# Patient Record
Sex: MALE | Race: WHITE | NOT HISPANIC OR LATINO | Employment: FULL TIME | URBAN - METROPOLITAN AREA
[De-identification: names, ages, dates, MRNs, and addresses within clinical notes are randomized per-mention and may not be internally consistent; named-entity substitution may affect disease eponyms.]

---

## 2017-07-14 ENCOUNTER — OFFICE VISIT - HEALTHEAST (OUTPATIENT)
Dept: FAMILY MEDICINE | Facility: CLINIC | Age: 37
End: 2017-07-14

## 2017-07-14 DIAGNOSIS — S46.911A SHOULDER STRAIN, RIGHT, INITIAL ENCOUNTER: ICD-10-CM

## 2017-07-14 DIAGNOSIS — S16.1XXA CERVICAL STRAIN: ICD-10-CM

## 2017-07-21 ENCOUNTER — COMMUNICATION - HEALTHEAST (OUTPATIENT)
Dept: TELEHEALTH | Facility: CLINIC | Age: 37
End: 2017-07-21

## 2017-07-21 ENCOUNTER — OFFICE VISIT - HEALTHEAST (OUTPATIENT)
Dept: FAMILY MEDICINE | Facility: CLINIC | Age: 37
End: 2017-07-21

## 2017-07-21 DIAGNOSIS — S16.1XXA NECK STRAIN: ICD-10-CM

## 2017-07-21 DIAGNOSIS — S46.919A SHOULDER STRAIN: ICD-10-CM

## 2017-07-26 ENCOUNTER — OFFICE VISIT - HEALTHEAST (OUTPATIENT)
Dept: PHYSICAL THERAPY | Facility: REHABILITATION | Age: 37
End: 2017-07-26

## 2017-07-26 DIAGNOSIS — M54.2 NECK PAIN ON RIGHT SIDE: ICD-10-CM

## 2017-07-26 DIAGNOSIS — M25.511 ACUTE PAIN OF RIGHT SHOULDER: ICD-10-CM

## 2017-07-26 DIAGNOSIS — R29.3 ABNORMAL POSTURE: ICD-10-CM

## 2017-07-26 DIAGNOSIS — M62.81 GENERALIZED MUSCLE WEAKNESS: ICD-10-CM

## 2017-07-26 DIAGNOSIS — M43.6 NECK STIFFNESS: ICD-10-CM

## 2017-08-02 ENCOUNTER — OFFICE VISIT - HEALTHEAST (OUTPATIENT)
Dept: PHYSICAL THERAPY | Facility: REHABILITATION | Age: 37
End: 2017-08-02

## 2017-08-02 DIAGNOSIS — M54.2 NECK PAIN ON RIGHT SIDE: ICD-10-CM

## 2017-08-02 DIAGNOSIS — M25.511 ACUTE PAIN OF RIGHT SHOULDER: ICD-10-CM

## 2017-08-02 DIAGNOSIS — R29.3 ABNORMAL POSTURE: ICD-10-CM

## 2017-08-02 DIAGNOSIS — M62.81 GENERALIZED MUSCLE WEAKNESS: ICD-10-CM

## 2017-08-02 DIAGNOSIS — M43.6 NECK STIFFNESS: ICD-10-CM

## 2017-08-16 ENCOUNTER — OFFICE VISIT - HEALTHEAST (OUTPATIENT)
Dept: PHYSICAL THERAPY | Facility: REHABILITATION | Age: 37
End: 2017-08-16

## 2017-08-16 DIAGNOSIS — M43.6 NECK STIFFNESS: ICD-10-CM

## 2017-08-16 DIAGNOSIS — M54.2 NECK PAIN ON RIGHT SIDE: ICD-10-CM

## 2017-08-16 DIAGNOSIS — M25.511 ACUTE PAIN OF RIGHT SHOULDER: ICD-10-CM

## 2017-08-16 DIAGNOSIS — M62.81 GENERALIZED MUSCLE WEAKNESS: ICD-10-CM

## 2017-08-16 DIAGNOSIS — R29.3 ABNORMAL POSTURE: ICD-10-CM

## 2017-08-23 ENCOUNTER — OFFICE VISIT - HEALTHEAST (OUTPATIENT)
Dept: PHYSICAL THERAPY | Facility: REHABILITATION | Age: 37
End: 2017-08-23

## 2017-08-23 DIAGNOSIS — M54.2 NECK PAIN ON RIGHT SIDE: ICD-10-CM

## 2017-08-23 DIAGNOSIS — M25.511 ACUTE PAIN OF RIGHT SHOULDER: ICD-10-CM

## 2017-08-23 DIAGNOSIS — M43.6 NECK STIFFNESS: ICD-10-CM

## 2017-12-14 ENCOUNTER — OFFICE VISIT - HEALTHEAST (OUTPATIENT)
Dept: INTERNAL MEDICINE | Facility: CLINIC | Age: 37
End: 2017-12-14

## 2017-12-14 DIAGNOSIS — Z00.00 ANNUAL PHYSICAL EXAM: ICD-10-CM

## 2017-12-14 DIAGNOSIS — R29.898 WEAKNESS OF RIGHT UPPER EXTREMITY: ICD-10-CM

## 2017-12-14 DIAGNOSIS — N50.812 LEFT TESTICULAR PAIN: ICD-10-CM

## 2017-12-14 DIAGNOSIS — M54.2 NECK PAIN: ICD-10-CM

## 2017-12-15 ENCOUNTER — COMMUNICATION - HEALTHEAST (OUTPATIENT)
Dept: INTERNAL MEDICINE | Facility: CLINIC | Age: 37
End: 2017-12-15

## 2017-12-18 ENCOUNTER — COMMUNICATION - HEALTHEAST (OUTPATIENT)
Dept: INTERNAL MEDICINE | Facility: CLINIC | Age: 37
End: 2017-12-18

## 2018-08-14 ENCOUNTER — OFFICE VISIT - HEALTHEAST (OUTPATIENT)
Dept: FAMILY MEDICINE | Facility: CLINIC | Age: 38
End: 2018-08-14

## 2018-08-14 DIAGNOSIS — M54.12 RADICULAR PAIN OF SHOULDER: ICD-10-CM

## 2018-08-16 ENCOUNTER — OFFICE VISIT - HEALTHEAST (OUTPATIENT)
Dept: INTERNAL MEDICINE | Facility: CLINIC | Age: 38
End: 2018-08-16

## 2018-08-16 DIAGNOSIS — R29.898 RIGHT ARM WEAKNESS: ICD-10-CM

## 2018-08-16 DIAGNOSIS — M54.2 NECK PAIN: ICD-10-CM

## 2018-08-21 ENCOUNTER — COMMUNICATION - HEALTHEAST (OUTPATIENT)
Dept: SCHEDULING | Facility: CLINIC | Age: 38
End: 2018-08-21

## 2018-08-21 DIAGNOSIS — S46.911A SHOULDER STRAIN, RIGHT, INITIAL ENCOUNTER: ICD-10-CM

## 2018-08-21 DIAGNOSIS — S16.1XXA CERVICAL STRAIN: ICD-10-CM

## 2018-08-23 ENCOUNTER — COMMUNICATION - HEALTHEAST (OUTPATIENT)
Dept: SCHEDULING | Facility: CLINIC | Age: 38
End: 2018-08-23

## 2018-08-23 DIAGNOSIS — M54.2 NECK PAIN: ICD-10-CM

## 2018-08-23 DIAGNOSIS — R29.898 ARM WEAKNESS: ICD-10-CM

## 2018-08-25 ENCOUNTER — RECORDS - HEALTHEAST (OUTPATIENT)
Dept: ADMINISTRATIVE | Facility: OTHER | Age: 38
End: 2018-08-25

## 2018-08-28 ENCOUNTER — COMMUNICATION - HEALTHEAST (OUTPATIENT)
Dept: INTERNAL MEDICINE | Facility: CLINIC | Age: 38
End: 2018-08-28

## 2018-08-28 DIAGNOSIS — M54.2 CERVICAL PAIN: ICD-10-CM

## 2018-08-28 DIAGNOSIS — M54.12 CERVICAL RADICULOPATHY AT C6: ICD-10-CM

## 2018-08-30 ENCOUNTER — COMMUNICATION - HEALTHEAST (OUTPATIENT)
Dept: INTERNAL MEDICINE | Facility: CLINIC | Age: 38
End: 2018-08-30

## 2018-09-04 ENCOUNTER — RECORDS - HEALTHEAST (OUTPATIENT)
Dept: ADMINISTRATIVE | Facility: OTHER | Age: 38
End: 2018-09-04

## 2018-09-06 ENCOUNTER — OFFICE VISIT - HEALTHEAST (OUTPATIENT)
Dept: NEUROSURGERY | Facility: CLINIC | Age: 38
End: 2018-09-06

## 2018-09-06 DIAGNOSIS — G54.2 CERVICAL NERVE ROOT COMPRESSION: ICD-10-CM

## 2018-09-06 ASSESSMENT — MIFFLIN-ST. JEOR: SCORE: 1920.97

## 2018-09-12 ENCOUNTER — HOSPITAL ENCOUNTER (OUTPATIENT)
Dept: PHYSICAL MEDICINE AND REHAB | Facility: CLINIC | Age: 38
Discharge: HOME | End: 2018-09-12
Attending: NEUROLOGICAL SURGERY

## 2018-09-12 DIAGNOSIS — G54.2 CERVICAL NERVE ROOT COMPRESSION: ICD-10-CM

## 2018-09-19 ENCOUNTER — RECORDS - HEALTHEAST (OUTPATIENT)
Dept: ADMINISTRATIVE | Facility: OTHER | Age: 38
End: 2018-09-19

## 2020-08-21 ENCOUNTER — COMMUNICATION - HEALTHEAST (OUTPATIENT)
Dept: SCHEDULING | Facility: CLINIC | Age: 40
End: 2020-08-21

## 2020-08-25 ENCOUNTER — OFFICE VISIT - HEALTHEAST (OUTPATIENT)
Dept: INTERNAL MEDICINE | Facility: CLINIC | Age: 40
End: 2020-08-25

## 2020-08-25 DIAGNOSIS — I10 BENIGN ESSENTIAL HYPERTENSION: ICD-10-CM

## 2020-08-25 ASSESSMENT — MIFFLIN-ST. JEOR: SCORE: 1880.15

## 2020-09-09 ENCOUNTER — AMBULATORY - HEALTHEAST (OUTPATIENT)
Dept: LAB | Facility: CLINIC | Age: 40
End: 2020-09-09

## 2020-09-09 ENCOUNTER — AMBULATORY - HEALTHEAST (OUTPATIENT)
Dept: NURSING | Facility: CLINIC | Age: 40
End: 2020-09-09

## 2020-09-09 DIAGNOSIS — I10 BENIGN ESSENTIAL HYPERTENSION: ICD-10-CM

## 2020-09-09 LAB
ANION GAP SERPL CALCULATED.3IONS-SCNC: 11 MMOL/L (ref 5–18)
BUN SERPL-MCNC: 18 MG/DL (ref 8–22)
CALCIUM SERPL-MCNC: 9.6 MG/DL (ref 8.5–10.5)
CHLORIDE BLD-SCNC: 103 MMOL/L (ref 98–107)
CHOLEST SERPL-MCNC: 192 MG/DL
CO2 SERPL-SCNC: 26 MMOL/L (ref 22–31)
CREAT SERPL-MCNC: 1.14 MG/DL (ref 0.7–1.3)
FASTING STATUS PATIENT QL REPORTED: YES
GFR SERPL CREATININE-BSD FRML MDRD: >60 ML/MIN/1.73M2
GLUCOSE BLD-MCNC: 99 MG/DL (ref 70–125)
HDLC SERPL-MCNC: 41 MG/DL
LDLC SERPL CALC-MCNC: 125 MG/DL
POTASSIUM BLD-SCNC: 4.3 MMOL/L (ref 3.5–5)
SODIUM SERPL-SCNC: 140 MMOL/L (ref 136–145)
TRIGL SERPL-MCNC: 128 MG/DL

## 2020-09-10 ENCOUNTER — COMMUNICATION - HEALTHEAST (OUTPATIENT)
Dept: INTERNAL MEDICINE | Facility: CLINIC | Age: 40
End: 2020-09-10

## 2020-10-20 ENCOUNTER — OFFICE VISIT - HEALTHEAST (OUTPATIENT)
Dept: INTERNAL MEDICINE | Facility: CLINIC | Age: 40
End: 2020-10-20

## 2020-10-20 DIAGNOSIS — I10 BENIGN ESSENTIAL HYPERTENSION: ICD-10-CM

## 2020-10-20 DIAGNOSIS — Z00.00 ANNUAL PHYSICAL EXAM: ICD-10-CM

## 2020-10-20 ASSESSMENT — MIFFLIN-ST. JEOR: SCORE: 1852.93

## 2020-11-09 ENCOUNTER — VIRTUAL VISIT (OUTPATIENT)
Dept: FAMILY MEDICINE | Facility: OTHER | Age: 40
End: 2020-11-09
Payer: COMMERCIAL

## 2020-11-09 PROCEDURE — 99421 OL DIG E/M SVC 5-10 MIN: CPT | Performed by: PREVENTIVE MEDICINE

## 2020-11-10 NOTE — PROGRESS NOTES
"Date: 2020 16:58:38  Clinician: Gualberto Briggs  Clinician NPI: [id number]  Patient: Mike Rhodes  Patient : 1980  Patient Address: [address] [place] [id number]  Patient Phone: [phone]  Visit Protocol: URI  Patient Summary:  Mike is a 40 year old ( : 1980 ) male who initiated a [facility] Visit for COVID-19 (Coronavirus) evaluation and screening. When asked the question \"Please sign me up to receive news, health information and promotions from [facility].\", Mike responded \"No\".    When asked when his symptoms started, Mike reported that he does not have any symptoms.   He denies taking antibiotic medication in the past month and having recent facial or sinus surgery in the past 60 days.    Pertinent COVID-19 (Coronavirus) information  Mike does not work or volunteer as healthcare worker or a . In the past 14 days, Mike has not worked or volunteered at a healthcare facility or group living setting.   In the past 14 days, he also has not lived in a congregate living setting.   Mike has had a close contact with a laboratory-confirmed COVID-19 patient in the last 14 days. He was not exposed at his work. Date Mike was exposed to the laboratory-confirmed COVID-19 patient: 2020   Additional information about contact with COVID-19 (Coronavirus) patient as reported by the patient (free text): In a practice space with other musicians   Mike is not living in the same household with the COVID-19 positive patient. He was in an enclosed space for greater than 15 minutes with the COVID-19 patient.   During the encounter, neither were wearing masks.   Since 2019, Mike has not been tested for COVID-19 and has not had upper respiratory infection or influenza-like illness.   Pertinent medical history  Mike does not need a return to work/school note.   Weight: 200 lbs   Mike does not smoke or use smokeless tobacco.   Weight: 200 lbs    " MEDICATIONS: benazepril-hydrochlorothiazide oral, ALLERGIES: NKDA  Clinician Response:  Dear Mike,   Based on your exposure to COVID-19 (coronavirus), we would like to test you for this virus.  1. Please call [phone] to schedule your visit. Explain that you were referred by [facility] to have a COVID-19 test. Be ready to share your [facility] visit ID number.  * If you need to schedule in [facility] please call [phone] or for [employer] [place] employees please call [phone].   * If you need to schedule in the [place] area please call [phone]. [place] employees call [phone].   The following will serve as your written order for this COVID Test, ordered by me, for the indication of suspected COVID [Z20.828]: The test will be ordered in [employer], our electronic health record, after you are scheduled. It will show as ordered and authorized by Gregorio Mckeon MD.  Order: COVID-19 (coronavirus) PCR for ASYMPTOMATIC EXPOSURE testing from [facility].   If you know you have had close contact with someone who tested positive, you should be quarantined for 14 days after this exposure. You should stay in quarantine for the14 days even if the covid test is negative, the optimal time to test after exposure is 5-7 days from the exposure  Quarantine means   What should I do?  For safety, it's very important to follow these rules. Do this for 14 days after the date you were last exposed to the virus..  Stay home and away from others. Don't go to school or anywhere else. Generally quarantine means staying home from work but there are some exceptions to this. Please contact your workplace.   No hugging, kissing or shaking hands.  Don't let anyone visit.  Cover your mouth and nose with a mask, tissue or washcloth to avoid spreading germs.  Wash your hands and face often. Use soap and water.  What are the symptoms of COVID-19?  The most common symptoms are cough, fever and trouble breathing. Less common symptoms include headache, body  aches, fatigue (feeling very tired), chills, sore throat, stuffy or runny nose, diarrhea (loose poop), loss of taste or smell, belly pain, and nausea or vomiting (feeling sick to your stomach or throwing up).  After 14 days, if you have still don't have symptoms, you likely don't have this virus.  If you develop symptoms, follow these guidelines.  If you're normally healthy: Please start another [facility] visit to report your symptoms. Go to [email or web address].  If you have a serious health problem (like cancer, heart failure, an organ transplant or kidney disease): Call your specialty clinic. Let them know that you might have COVID-19.  2. When it's time for your COVID test:  Stay at least 6 feet away from others. (If someone will drive you to your test, stay in the backseat, as far away from the  as you can.)  Cover your mouth and nose with a mask, tissue or washcloth.  Go straight to the testing site. Don't make any stops on the way there or back.  Please note  Caregivers in these groups are at risk for severe illness due to COVID-19:  o People 65 years and older  o People who live in a nursing home or long-term care facility  o People with chronic disease (lung, heart, cancer, diabetes, kidney, liver, immunologic)  o People who have a weakened immune system, including those who:  Are in cancer treatment  Take medicine that weakens the immune system, such as corticosteroids  Had a bone marrow or organ transplant  Have an immune deficiency  Have poorly controlled HIV or AIDS  Are obese (body mass index of 40 or higher)  Smoke regularly  Where can I get more information?   [employer] -- About COVID-19: [email or web address]  CDC -- What to Do If You're Sick: www.cdc.gov/coronavirus/2019-ncov/about/steps-when-sick.html  CDC -- Ending Home Isolation: www.cdc.gov/coronavirus/2019-ncov/hcp/disposition-in-home-patients.html  CDC -- Caring for Someone:  www.cdc.gov/coronavirus/2019-ncov/if-you-are-sick/care-for-someone.html  [facility] -- Interim Guidance for Hospital Discharge to Home: www.health.[facility].[place].us/diseases/coronavirus/hcp/hospdischarge.pdf  [facility] clinical trials (COVID-19 research studies): clinicalaffairs.[facility]-clinical-trials  Below are the COVID-19 hotlines at the [place] Department of Health ([facility]). Interpreters are available.  For health questions: Call 713-880-5016 or 1-983.669.1465 (7 a.m. to 7 p.m.)  For questions about schools and childcare: Call 124-723-9662 or 1-409.849.2821 (7 a.m. to 7 p.m.)    Diagnosis: Contact with and (suspected) exposure to other viral communicable diseases  Diagnosis ICD: Z20.828

## 2021-03-23 ENCOUNTER — OFFICE VISIT - HEALTHEAST (OUTPATIENT)
Dept: INTERNAL MEDICINE | Facility: CLINIC | Age: 41
End: 2021-03-23

## 2021-03-23 DIAGNOSIS — I10 BENIGN ESSENTIAL HYPERTENSION: ICD-10-CM

## 2021-03-23 DIAGNOSIS — Q80.9 XERODERMIA: ICD-10-CM

## 2021-03-23 RX ORDER — HYDROCHLOROTHIAZIDE 25 MG/1
25 TABLET ORAL DAILY
Qty: 90 TABLET | Refills: 2 | Status: SHIPPED | OUTPATIENT
Start: 2021-03-23 | End: 2022-04-28

## 2021-05-27 VITALS — DIASTOLIC BLOOD PRESSURE: 64 MMHG | SYSTOLIC BLOOD PRESSURE: 120 MMHG

## 2021-05-28 ASSESSMENT — ASTHMA QUESTIONNAIRES
ACT_TOTALSCORE: 25
ACT_TOTALSCORE: 25

## 2021-05-31 VITALS — BODY MASS INDEX: 31.46 KG/M2 | WEIGHT: 225.6 LBS

## 2021-05-31 VITALS — WEIGHT: 221.3 LBS | BODY MASS INDEX: 30.87 KG/M2

## 2021-05-31 VITALS — BODY MASS INDEX: 31.38 KG/M2 | WEIGHT: 225 LBS

## 2021-06-01 VITALS — BODY MASS INDEX: 30.52 KG/M2 | HEIGHT: 71 IN | WEIGHT: 218 LBS

## 2021-06-01 VITALS — WEIGHT: 218 LBS | BODY MASS INDEX: 30.4 KG/M2

## 2021-06-01 VITALS — WEIGHT: 220.7 LBS | BODY MASS INDEX: 30.78 KG/M2

## 2021-06-04 VITALS
HEART RATE: 70 BPM | SYSTOLIC BLOOD PRESSURE: 126 MMHG | DIASTOLIC BLOOD PRESSURE: 64 MMHG | HEIGHT: 71 IN | BODY MASS INDEX: 28.42 KG/M2 | WEIGHT: 203 LBS

## 2021-06-04 VITALS
WEIGHT: 209 LBS | BODY MASS INDEX: 29.26 KG/M2 | DIASTOLIC BLOOD PRESSURE: 72 MMHG | SYSTOLIC BLOOD PRESSURE: 142 MMHG | HEIGHT: 71 IN

## 2021-06-05 VITALS
DIASTOLIC BLOOD PRESSURE: 72 MMHG | WEIGHT: 196 LBS | SYSTOLIC BLOOD PRESSURE: 118 MMHG | HEART RATE: 70 BPM | BODY MASS INDEX: 27.34 KG/M2

## 2021-06-10 NOTE — TELEPHONE ENCOUNTER
"Pt calling    BP usually in the 120/70 range in 's Office  Pt wife brought home a New BP machine 2 days ago & pt has been monitoring /92 reading  was highest  Now 126/88  No HA  No blurred vision  Denies numbness or weakness on one side of body  Denies chest pain or SOB  Drinks \"a ton of Mountain dew\"  Trying to cut down on soda    Per protocol pt to be seen within 2 weeks  Reviewed care advice & when to call back  Pt agrees with plan  Warm transfer to  for appointment.  Brynn Cabrera RN  [place] Nurse Advisor        Reason for Disposition    [1] Systolic BP  >= 130 OR Diastolic >= 80 AND [2] not taking BP medications    Additional Information    Negative: Symptom is main concern  (e.g., headache, chest pain)    Negative: Low blood pressure is main concern    Negative: Difficult to awaken or acting confused (e.g., disoriented, slurred speech)    Negative: Severe difficulty breathing (e.g., struggling for each breath, speaks in single words)    Negative: [1] Weakness of the face, arm or leg on one side of the body AND [2] new onset    Negative: [1] Numbness (i.e., loss of sensation) of the face, arm or leg on one side of the body AND [2] new onset    Negative: [1] Chest pain lasts > 5 minutes AND [2] history of heart disease  (i.e., heart attack, bypass surgery, angina, angioplasty, CHF)    Negative: [1] Chest pain AND [2] took nitrogylcerin AND [3] pain was not relieved    Negative: Sounds like a life-threatening emergency to the triager    Negative: [1] Systolic BP  >= 160 OR Diastolic >= 100 AND [2] cardiac or neurologic symptoms (e.g., chest pain, difficulty breathing, unsteady gait, blurred vision)    Negative: [1] Pregnant > 20 weeks (or postpartum < 6 weeks) AND [2] new hand or face swelling    Negative: [1] Pregnant > 20 weeks AND [2] BP Systolic BP  >= 140 OR Diastolic >= 90    Negative: [1] Systolic BP  >= 200 OR Diastolic >= 120  AND [2] having NO cardiac or neurologic symptoms    " Negative: [1] Postpartum < 6 weeks AND [2] BP Systolic BP  >= 140 OR Diastolic >= 90    Negative: [1] Systolic BP  >= 180 OR Diastolic >= 110 AND [2] missed most recent dose of blood pressure medication    Negative: Systolic BP  >= 180 OR Diastolic >= 110    Negative: Ran out of BP medications    Negative: Systolic BP  >= 160 OR Diastolic >= 100    Negative: [1] Taking BP medications AND [2] feels is having side effects (e.g., impotence, cough, dizzy upon standing)    Negative: [1] Systolic BP  >= 130 OR Diastolic >= 80 AND [2] pregnant    Negative: [1] Systolic BP  >= 130 OR Diastolic >= 80 AND [2] taking BP medications    Protocols used: HIGH BLOOD PRESSURE-A-AH

## 2021-06-10 NOTE — PROGRESS NOTES
Internal Medicine Office Visit  [facility]   Patient Name: Mike Rhodes  Patient Age: 40 y.o.  YOB: 1980  MRN: [id number]    Date of Visit: 2020  Reason for Office Visit:   Chief Complaint   Patient presents with     Hypertension           Assessment / Plan / Medical Decision Makin. Benign essential hypertension  - START: hydroCHLOROthiazide (HYDRODIURIL) 25 MG tablet; Take 1 tablet (25 mg total) by mouth daily.  Dispense: 90 tablet; Refill: 1. Reviewed common side effects  - Encouraged weight loss, reduction of sodium intake, and additional aerobic exercise. He is motivated to get off of the medication if this becomes possible in the future   - Basic Metabolic Panel; Future  - Lipid [place] FASTING; Future  - Return in 2-3 weeks for BP check and lab appointment         Health Maintenance Review  Health Maintenance   Topic Date Due     ASTHMA ACTION PLAN  1980     Asthma Control Test  1980     HIV SCREENING  01/15/1996     PREVENTIVE CARE VISIT  2018     LIPID  2020     ADVANCE CARE PLANNING  2020     INFLUENZA VACCINE RULE BASED (1) 2020     TD 18+ HE  2024     TDAP ADULT ONE TIME DOSE  Completed     HEPATITIS B VACCINES  Aged Out         I have discontinued Navneet Rhodes's cyclobenzaprine, gabapentin, and HYDROcodone-acetaminophen. I am also having him start on hydroCHLOROthiazide.      HPI:  Mike Rhodes is a 40 y.o. year old who presents to the office today for elevated blood pressure readings on a home cuff. His readings range from 116-140/80s. He denies alcohol or tobacco use. He cut back on caffeine about 4 weeks ago. He exercises regularly. He is not aware of a family history of HTN.     He feels a pulsing in the ears.     Review of Systems- pertinent positive in bold:  Negative for chest pain or dyspnea       Current Scheduled Meds:  Outpatient Encounter Medications as of 2020   Medication  "Sig Dispense Refill     hydroCHLOROthiazide (HYDRODIURIL) 25 MG tablet Take 1 tablet (25 mg total) by mouth daily. 90 tablet 1     [DISCONTINUED] cyclobenzaprine (FLEXERIL) 5 MG tablet Take 1 tablet (5 mg total) by mouth 3 (three) times a day as needed for muscle spasms. 30 tablet 0     [DISCONTINUED] gabapentin (NEURONTIN) 300 MG capsule Take 1 capsule (300 mg total) by mouth 2 (two) times a day. 60 capsule 0     [DISCONTINUED] HYDROcodone-acetaminophen 5-325 mg per tablet Take 1-2 tablets by mouth every 4 (four) hours as needed for pain. 30 tablet 0     No facility-administered encounter medications on file as of 8/25/2020.        Past Medical History:   Diagnosis Date     Asthma      Cervical pain 8/28/2018     Cervical radiculopathy at C6 8/28/2018     Exercise-induced asthma - feels that an MDI did not help - runs now without issues 5/19/2015     Xerodermia - dry skin - cracked/fissured feet in the winter 5/19/2015     Past Surgical History:   Procedure Laterality Date     NO PAST SURGERIES       Social History     Tobacco Use     Smoking status: Never Smoker     Smokeless tobacco: Never Used   Substance Use Topics     Alcohol use: No     Comment: Never a problem, but there are other family members who have had issues.     Drug use: No       Objective / Physical Examination:  Vitals:    08/25/20 1606   BP: 142/72   Weight: 209 lb (94.8 kg)   Height: 5' 11\" (1.803 m)     Wt Readings from Last 3 Encounters:   08/25/20 209 lb (94.8 kg)   09/06/18 218 lb (98.9 kg)   08/16/18 218 lb (98.9 kg)     Body mass index is 29.15 kg/m .     Constitutional: In no apparent distress  ENT: Tympanic membrane clear with landmarks well visualized bilaterally.  Respiratory: Clear to auscultation bilaterally. Normal inspiratory and expiratory effort  Cardiovascular: Regular rate and rhythm. No murmurs, rubs, or gallops. No edema. No carotid bruits.       Orders Placed This Encounter   Procedures     Basic Metabolic Panel     Lipid " [place] FASTING   Followup: Return in about 6 months (around 2/25/2021) for Next scheduled follow up. earlier if needed.        Mariza Nunez, CNP

## 2021-06-11 NOTE — PROGRESS NOTES
I met with Mike Rhodes at the request of Mariza Nunez to recheck his blood pressure.  Blood pressure medications on the MAR were reviewed with patient.    Patient has taken all medications as per usual regimen: Yes  Patient reports tolerating them without any issues or concerns: Yes    Vitals:    09/09/20 0813   BP: 120/64       Blood pressure was taken, previous encounter was reviewed, recorded blood pressure below 140/90.  Patient was discharged and the note will be sent to the provider for final review.

## 2021-06-11 NOTE — PROGRESS NOTES
Chief Complaint   Patient presents with     right shoulder pain     x 2 days ago afer work felt like a stiff neck, now shoulder hurts       HPI    Patient is here for 2 days of gradual onset of R sided neck pain, and right posterior shoulder pain radiating to right upper arm, worsens with certain ROMs. No injury noted. No weakness, numbness, tingling of right shoulder/arm, fever, chills.     ROS: Pertinent ROS noted in HPI.     No Known Allergies    Patient Active Problem List   Diagnosis     Blepharitis     Exercise-induced asthma - feels that an MDI did not help - runs now without issues     Xerodermia - dry skin - cracked/fissured feet in the winter       Family History   Problem Relation Age of Onset     Alcohol abuse Father      History of ETOH issues.  Now abstinent.     No Medical Problems Brother      No Medical Problems Daughter        Social History     Social History     Marital status:      Spouse name: N/A     Number of children: N/A     Years of education: N/A     Occupational History     Not on file.     Social History Main Topics     Smoking status: Never Smoker     Smokeless tobacco: Never Used     Alcohol use No      Comment: Never a problem, but there are other family members who have had issues.     Drug use: No     Sexual activity: Yes     Partners: Female     Other Topics Concern     Not on file     Social History Narrative         Objective:    Vitals:    07/14/17 1321   BP: 122/88   Pulse: 87   Resp: 16   Temp: 98.4  F (36.9  C)   SpO2: 99%       Gen:NAD  CV: RRR, no M, R, G  Pulm: CTAB  MSK: normal inspection of neck, shoulders, back, upper extremities. No tenderness to palpation of neck, back, shoulders, upper extremities. Full ROM of except reduced flexion of neck due to pain. Full ROM and strength of shoulders, 5/5 strength of bilateral upper extremities.   Neuro: 2+ brachioradialis DTRs bilaterally. Intact and symmetric gross sensation of upper extremities.       Cervical  strain  -     cyclobenzaprine (FLEXERIL) 5 MG tablet; Take 1 tablet (5 mg total) by mouth 3 (three) times a day as needed for muscle spasms.  -     ibuprofen (ADVIL,MOTRIN) 800 MG tablet; Take 1 tablet (800 mg total) by mouth every 6 (six) hours as needed for pain.    Shoulder strain, right, initial encounter  -     cyclobenzaprine (FLEXERIL) 5 MG tablet; Take 1 tablet (5 mg total) by mouth 3 (three) times a day as needed for muscle spasms.  -     ibuprofen (ADVIL,MOTRIN) 800 MG tablet; Take 1 tablet (800 mg total) by mouth every 6 (six) hours as needed for pain.

## 2021-06-12 NOTE — PROGRESS NOTES
[facility] Daily Progress     Patient Name: Mike Rhodes  Date: 2017  Visit #: 2  PTA visit #:    Referral Diagnosis:   840.9 (ICD-9-CM) - S46.919A (ICD-10-CM) - Shoulder strain   847.0 (ICD-9-CM) - S16.1XXA (ICD-10-CM) - Neck strain     Referring provider: Ailyn Atkinson MD  Visit Diagnosis:     ICD-10-CM    1. Neck pain on right side M54.2    2. Acute pain of right shoulder M25.511    3. Neck stiffness M43.6    4. Generalized muscle weakness M62.81    5. Abnormal posture R29.3          Assessment:     Patient is benefitting from skilled physical therapy and is making steady progress toward functional goals.  Patient is appropriate to continue with skilled physical therapy intervention, as indicated by initial plan of care.     Pt presenting scapular weakness and SICK scapula today with further assessment. Pt having increased difficulty with maintaining shoulder retraction and proper upward rotation of scapula with shoulder elevation. Pt is understanding of his need to strengthen these muscles and will continue with HEP. Pt continues to have stiffness with R shoulder elevation, but decreasing.     Goal Status:  Pt will: be independent with HEP within 3 weeks.  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  Pt will: decrease SPADI by 9 points to demonstrate improved function within 8 weeks.  Pt will: be able to lift arms overhead without pain for work function within 6 weeks.    Plan / Patient Education:     Continue with initial plan of care.  Progress with home program as tolerated.     Next session: review prone exercises, serratus strengthening, tool assisted STM as helpful, continue progressive scapular strengthening    Subjective:     Pain Ratin  Pt noticing less tightness in his arm and less overall pain. He continues to notice tightness in his neck and weakness throughout the arm including his .       Objective:     SICK scapula on R, limited  "upward rotation and retraction   Weakness in serratus and scapular    Continued general tightness in cervical musculature    Educated on other postural exercises including rows, push-ups    Treatment Today     TREATMENT MINUTES COMMENTS   Evaluation     Self-care/ Home management     Manual therapy 10 In prone:  - grade 3 scapular mobilizations including medial, inferior, and \"J\"  - tool assisted STM to R upper traps, cervical paraspinals, and rhomboids   Neuromuscular Re-education     Therapeutic Activity     Therapeutic Exercises 20 See exercises. Added prone Is, Ts, and Ys also push-ups with plus.   Gait training     Modality__________________                Total 30    Blank areas are intentional and mean the treatment did not include these items.       Ian Moe, PT, DPT  8/2/2017    "

## 2021-06-12 NOTE — PROGRESS NOTES
Assessment:     1. Shoulder strain  predniSONE (DELTASONE) 10 mg tablet    Ambulatory referral to Physical Therapy   2. Neck strain  predniSONE (DELTASONE) 10 mg tablet    Ambulatory referral to Physical Therapy          Plan:     Still with ongoing right shoulder upper back and neck strain.  Prescription given for a burst taper of prednisone and referral made to physical therapy for further evaluation and treatment.  He may continue the cyclobenzaprine as well.  Recommend following up with his primary care physician if getting worse or not improving.    Subjective:       37 y.o. male presents for evaluation continued right shoulder and neck discomfort.  Believes he initially strained his upper back shoulder and neck after doing some weightlifting.  He was seen on 7/14/2017 in walk-in care and was given a muscle relaxant and ibuprofen.  These notes were reviewed by myself.  Overall he states that he was feeling better but 2 days ago woke up in the morning and the pain was worse.  He feels a lot of tightness in the right aspect of his neck and upper back and into his shoulder.  He denies any weakness.  He is not having any numbness tingling or pain down his extremity.  Is wondering what else to do at this point.  Has been using ice but not heat.  Only taking the muscle relaxant on 2 occasions.      The following portions of the patient's history were reviewed and updated as appropriate: allergies, current medications, past family history, past medical history, past social history, past surgical history and problem list.    Review of Systems  A 12 point comprehensive review of systems was negative except as noted.     Objective:      /84  Pulse 82  Temp 98.4  F (36.9  C) (Oral)   Resp 16  Wt (!) 225 lb 9.6 oz (102.3 kg)  SpO2 99%  BMI 31.46 kg/m2  General appearance: alert, appears stated age and cooperative  Neck: She has some tightness of his paraspinal muscles on the right side of his posterior neck.   No midline tenderness.  Extremities: Patient is noted to have excellent range of motion of his shoulder.  He does have significant ropiness and tightness of his trapezius muscle and deltoid muscle on the right. is no overlying skin changes or erythema.    This note has been dictated using voice recognition software. Any grammatical or context distortions are unintentional and inherent to the software

## 2021-06-12 NOTE — PROGRESS NOTES
"[facility] Daily Progress     Patient Name: Mike Rhodes  Date: 2017  Visit #: 3  PTA visit #:    Referral Diagnosis:   840.9 (ICD-9-CM) - S46.919A (ICD-10-CM) - Shoulder strain   847.0 (ICD-9-CM) - S16.1XXA (ICD-10-CM) - Neck strain     Referring provider: Ailyn Atkinson MD  Visit Diagnosis:     ICD-10-CM    1. Neck pain on right side M54.2    2. Acute pain of right shoulder M25.511    3. Neck stiffness M43.6    4. Generalized muscle weakness M62.81    5. Abnormal posture R29.3          Assessment:     Patient is benefitting from skilled physical therapy and is making steady progress toward functional goals.  Patient is appropriate to continue with skilled physical therapy intervention, as indicated by initial plan of care.     Pt continues to have minimal changes in R scapular movement and mobility. Pt ma have been using too high of weights with HEP. Endurance and stability will be more of a focus with higher repetitions. Taping may be appropriate to assist with stability as well as proper mechanics of the R scapular and shoulder movement. Pt was educated on light elbow/wrist stretching for tightness in those areas.     Goal Status:  Pt will: be independent with HEP within 3 weeks.  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  Pt will: decrease SPADI by 9 points to demonstrate improved function within 8 weeks.  Pt will: be able to lift arms overhead without pain for work function within 6 weeks.    Plan / Patient Education:     Continue with initial plan of care.  Progress with home program as tolerated.     Next session: review prone exercises, serratus strengthening (review supine serr punches), tool assisted STM as helpful, continue progressive scapular strengthening (TB RTC strengthening), possible taping if needed    Subjective:     Pain Ratin  Pt continues to notice weakness, especially with his  when at work. \"I have a hard time holding " "onto his drill when at work.\" He has not noticed much pain lately.       Objective:     Continued SICK scapula on R   Educated on importance of high repetitions and low resistance in                         regarding to weight training    Continued stiffness at end ranges of shoulder ABD and flexion   Tightness throughout B shoulders, elbows, and wrists    Treatment Today     TREATMENT MINUTES COMMENTS   Evaluation     Self-care/ Home management     Manual therapy     Neuromuscular Re-education     Therapeutic Activity     Therapeutic Exercises 35 See exercises. Added supine serratus punches to HEP.   Gait training     Modality__________________                Total 35    Blank areas are intentional and mean the treatment did not include these items.       Ian Moe, PT, DPT  8/16/2017  "

## 2021-06-12 NOTE — PROGRESS NOTES
Assessment/Plan:     1. Annual physical exam  - Discussed lifestyle changes. Patient is encouraged to continue healthy eating habits and exercise.   - Incorporate Lots of fresh fruits, fresh green leafy vegetables, and whole grains.   - Its important to limit or avoid prepackaged or processed foods. Avoid foods with alexus sugar content.     2. Benign essential hypertension  - Patient encouraged to continue taking medication as ordered. Monitor for headaches, lightheadedness and dizziness.   - hydroCHLOROthiazide (HYDRODIURIL) 25 MG tablet; Take 1 tablet (25 mg total) by mouth daily.  Dispense: 90 tablet; Refill: 1        Subjective:     Mike Rhodes is a 40 y.o. male who presents for an annual exam.   No recent illness, hospitalizations, injuries, or surgeries. Patient reports doing well. Putting more efforts into eating a healthy diet and exercising. Lives at home with wife and two daughters. Does not report any acute stressors. He has a diagnosis of hypertension, has been taking hydrochlorothiazide 25 mg daily. He s been tolerating his medication pretty well and not experiencing any lightheadedness or dizziness associated with the medication. He reports occassional headaches, but attributes these to not taking in enough water.   He reports forgetting doses about 2-3 times a month. But has otherwise been compliant.     The patient reports that there is not domestic violence in his life.     Healthy Habits:   Regular Exercise: Yes 2x times a week rowing. Playing Tennis every 2 weeks  Sunscreen Use: Yes  Healthy Diet: Yes Typical meal [place] for lunch, dinners at home working on eating more vegetables and salads at home. Drinking about 2 liters of water a day. Cut back on mountain dew to 1 can a few times a week. I cup of coffee a day. Does not drink alcohol.  Dental Visits Regularly: Yes Last seen 3 weeks ago.  Vison Exam: Last year January   Sexually active: Yes      Immunization History   Administered  Date(s) Administered     INFLUENZA,SEASONAL QUAD, PF, =/> 6months 12/14/2017, 10/20/2020     Tdap 12/31/2014           Current Outpatient Medications   Medication Sig Dispense Refill     hydroCHLOROthiazide (HYDRODIURIL) 25 MG tablet Take 1 tablet (25 mg total) by mouth daily. 90 tablet 1     No current facility-administered medications for this visit.      Past Medical History:   Diagnosis Date     Asthma      Cervical pain 8/28/2018     Cervical radiculopathy at C6 8/28/2018     Exercise-induced asthma - feels that an MDI did not help - runs now without issues 5/19/2015     Xerodermia - dry skin - cracked/fissured feet in the winter 5/19/2015     Past Surgical History:   Procedure Laterality Date     NO PAST SURGERIES       Patient has no known allergies.  Family History   Problem Relation Age of Onset     Alcohol abuse Father         History of ETOH issues.  Now abstinent.     No Medical Problems Brother      No Medical Problems Daughter      Colon cancer Maternal Uncle      Social History     Socioeconomic History     Marital status:      Spouse name: Not on file     Number of children: 2     Years of education: Not on file     Highest education level: Not on file   Occupational History     Occupation: Appliance Paper Technician    Social Needs     Financial resource strain: Not on file     Food insecurity     Worry: Not on file     Inability: Not on file     Transportation needs     Medical: Not on file     Non-medical: Not on file   Tobacco Use     Smoking status: Never Smoker     Smokeless tobacco: Never Used   Substance and Sexual Activity     Alcohol use: No     Comment: Never a problem, but there are other family members who have had issues.     Drug use: No     Sexual activity: Yes     Partners: Female   Lifestyle     Physical activity     Days per week: Not on file     Minutes per session: Not on file     Stress: Not on file   Relationships     Social connections     Talks on phone: Not on file     " Gets together: Not on file     Attends Restorationist service: Not on file     Active member of club or organization: Not on file     Attends meetings of clubs or organizations: Not on file     Relationship status: Not on file     Intimate partner violence     Fear of current or ex partner: Not on file     Emotionally abused: Not on file     Physically abused: Not on file     Forced sexual activity: Not on file   Other Topics Concern     Not on file   Social History Narrative     Not on file       Review of Systems  General: Denies, fever, chills fatigue, unintentional weigh tloss.   Eyes: Negative except as noted above  Ears/Nose/Throat: Negative except as noted above  Cardiovascular: Negative except as noted above  Respiratory:  Negative except as noted above  Gastrointestinal:  Negative except as noted above. Stools once a day or every other day.   Genitourinary: Dribbling at the end of urine stream for a few years.   Musculoskeletal: Denies muscle aches, arthralgias.   Skin: Denies skin rashes excessive bruising   Neurologic: gets \"sunday headaches\" thinks its related to poor water intake when he's at home.    Psychiatric: Denies thoughts of self harm or thoughts of harming others.   Endocrine: Denies excessive thirst, urination or intolerance to heat or cold. Denies numbness or tingling in finger tips or toes.   Heme/Lymphatic: Denies excessive bruising.    Allergic/Immunologic:      Objective:      Vitals:    10/20/20 0832   BP: 126/64   Pulse: 70   Weight: 203 lb (92.1 kg)   Height: 5' 11\" (1.803 m)     Wt Readings from Last 3 Encounters:   10/20/20 203 lb (92.1 kg)   08/25/20 209 lb (94.8 kg)   09/06/18 218 lb (98.9 kg)     Body mass index is 28.31 kg/m .     Physical Exam:  Constitutional: Well developed, well nourished, no acute distress.  HEENT: normocephalic/atraumatic, PERRL/EOMI, TMs: Gray, normal light reflex, no nasal discharge.  Oral mucosa: moist; no erythema/exudate  Neck: No " LAD/masses/thyromegaly/bruits  Lungs: clear bilaterally  Heart: regular rate and rhythm, no murmurs/gallops/rubs  Abdomen: Normal bowel sounds, soft, non-tender, non-distended, no masses  Lymphatics: no supraclavicular/cervical LAD. No edema.  Neuro: A&O x 3  Musculoskeletal: no gross deformities.  Skin: no rashes or lesions.  Psych: Behavior appropriate, engaging.  Thought processes congruent, non-tangential    NERI Ashford Student     Patient was seen with NP student, Elsa White. I agree with assessment and plan.  NERI Smith

## 2021-06-12 NOTE — PROGRESS NOTES
[facility] Daily Progress     Patient Name: Mike Rhodes  Date: 2017  Visit #: 4  PTA visit #:    Referral Diagnosis:   840.9 (ICD-9-CM) - S46.919A (ICD-10-CM) - Shoulder strain   847.0 (ICD-9-CM) - S16.1XXA (ICD-10-CM) - Neck strain     Referring provider: Ailyn Atkinson MD  Visit Diagnosis:     ICD-10-CM    1. Neck pain on right side M54.2    2. Acute pain of right shoulder M25.511    3. Neck stiffness M43.6          Assessment:     Patient is benefitting from skilled physical therapy and is making steady progress toward functional goals.  Patient is appropriate to continue with skilled physical therapy intervention, as indicated by initial plan of care.     Pt having improved scapular control today, but continued weakness in the UE. Pt continues to have weakness of the wrist and hand as well. Pt has been more focused on control of the shoulder with HEP and regular exercise routine. Pt to return to PT in a couple weeks.    Goal Status:  Pt will: be independent with HEP within 3 weeks.  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  Pt will: decrease SPADI by 9 points to demonstrate improved function within 8 weeks.  Pt will: be able to lift arms overhead without pain for work function within 6 weeks.    Plan / Patient Education:     Continue with initial plan of care.  Progress with home program as tolerated.     Next session: continue prone exercises, serratus strengthening (review supine serr punches), tool assisted STM as helpful, continue progressive scapular strengthening (TB RTC strengthening), scapular mobilizations as helpful    Subjective:     Pain Ratin  Pt is not noticing much stiffness in the shoulder or neck. He continues to notice weakness in his R  and lifting overhead.    Objective:     Continued SICK scapula on R   Improving control with shoulder elevation    Improving scapular retraction with all exercises    No pain during  "session    Treatment Today     TREATMENT MINUTES COMMENTS   Evaluation     Self-care/ Home management     Manual therapy 10 In prone:  - grade 3 and 4 inferior, medial, and\"J\" mobilizations for mobility   Neuromuscular Re-education     Therapeutic Activity     Therapeutic Exercises 20 See exercises   Gait training     Modality__________________                Total 30    Blank areas are intentional and mean the treatment did not include these items.       Ian Moe, PT, DPT  8/23/2017  "

## 2021-06-12 NOTE — PROGRESS NOTES
[facility]   Cervical Thoracic Initial Evaluation    Patient Name: Mike Rhodes  Date of evaluation: 7/26/2017  Referral Diagnosis: Shoulder strain  Referring provider: Ailyn Atkinson MD  Visit Diagnosis:     ICD-10-CM    1. Neck pain on right side M54.2    2. Acute pain of right shoulder M25.511    3. Neck stiffness M43.6    4. Generalized muscle weakness M62.81    5. Abnormal posture R29.3        Assessment:     Mike Rhodes is a 37 y.o. male who presents to therapy today with chief complaints of R sided neck and shoulder pain and stiffness that has been ongoing since 7/12/17. Pt does not recall an injury. Pt presents with stiffness and tightness with cervical L SB and R rotation. Minimal differences in R and L UE strengthening, but stiffness at end ranges with R shoulder ROM. Pt presents with poor posture which may be contributing to muscle tightness. Tenderness to R posterior deltoid was also present, but no radiating pain or loss in sensation. Pt would benefit from furthering stretching and postural strengthening/stabilization. Pt reports no other significant past medical history.  Pain symptoms are not improving.  Functional impairments include prolonged standing/sitting/walking, transfers, bed mobility, lifting, reaching, and ADLs.  Pt demo's signs and sx consistent with possible R shoulder strain.     Impairments in  pain, posture, ROM, strength, ADL's  The POC is dynamic and will be modified on an ongoing basis.  Patient will return to clinic if symptoms persist.  Barriers to achieving goals as noted in the assessment section may affect outcome.  Prognosis to achieve goals is  good   Skilled PT is required to improve mobility, ROM, flexibility, posture, strength, and reduce pain.  Pt. is appropriate for skilled PT intervention as outlined in the Plan of Care (POC).  Pt. is a good candidate for skilled PT services to improve pain levels and function.    Goals:  Pt will: be  independent with HEP within 3 weeks.  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  Pt will: decrease SPADI by 9 points to demonstrate improved function within 8 weeks.  Pt will: be able to lift arms overhead without pain for work function within 6 weeks.    Patient's expectations/goals are realistic.    Barriers to Learning or Achieving Goals:  No Barriers.       Plan / Patient Instructions:        Plan of Care:   Authorization / Certification Number of Visits: up to 20  Communication with: Referral Source  Patient Related Instruction: Nature of Condition;Treatment plan and rationale;Self Care instruction;Body mechanics;Posture;Basis of treatment;Precautions;Next steps;Expected outcome  Times per Week: 1-2  Number of Weeks: 4-5  Number of Visits: 8-10  Discharge Planning: Pt will be discharged when all goals are met, lack of progress or worsening condition, MD referral, and/or pt desires to continue with HEP independently.  Therapeutic Exercise: ROM;Stretching;Strengthening  Neuromuscular Reeducation: kinesio tape;posture;balance/proprioception;TNE  Manual Therapy: soft tissue mobilization;myofascial release;muscle energy;joint mobilization;strain counterstrain  Modalities: traction;electrical stimulation;TENS;iontophoresis;ultrasound;cold pack;hot pack  Functional Training (ADL's): self care;ADL's;compensatory training;meal prep;instructions in transfers;ergonomics;instructions for equipment  Equipment: theraband;TENS unit    Plan for next visit: review HEP, STM/MFR to neck musculature and parascapular, pec stretching, postural strengthening     Subjective:    Social information:   Occupation:appliance repair   Work Status:Working part time   Equipment Available: None    History of Present Illness:    Mike is a 37 y.o. male who presents to therapy today with complaints of R sided neck and shoulder pain that has been ongoing for 2-3 weeks. He does not recall any injury. He initially  "noticed stiffness in his neck before going to bed and noticing more pain into the R upper arm when waking up. He has had \"tolerable pain\" since starting taking prednisone. Symptoms are not improving. He denies history of similar symptoms. He describes their previous level of function as not limited.    Pain Ratin  Pain rating at best: 2  Pain rating at worst: 8  Pain description: dull and weakness    Functional limitations are described as occurring with:   bed mobility  lifting  performing routine daily activities  sitting : 10-15 minute tolerance  sleeping  standing : 2-3 hour tolerance    Patient reports benefit from:  prednisone         Objective:      Note: Items left blank indicates the item was not performed or not indicated at the time of the evaluation.    Patient Outcome Measures :    Shoulder Pain and Disability Index (SPADI) in %: 40   Scores range from 0-100%, where a score of 0% represents minimal pain and maximal function. The minimal clincically important difference is a score reduction of 10%.    Cervical Thoracic Examination  1. Neck pain on right side     2. Acute pain of right shoulder     3. Neck stiffness     4. Generalized muscle weakness     5. Abnormal posture       Involved side: Right  Posture Observation:      General sitting posture is  fair.  General standing posture is fair.    Cervical ROM:    Date: 17     *Indicate scale AROM AROM AROM   Cervical Flexion Min restriction, tightness     Cervical Extension WFL      Right Left Right Left Right Left   Cervical Sidebending 40 deg 28 deg       Cervical Rotation WFL, tightness WFL       Cervical Protraction      Cervical Retraction      Thoracic Flexion      Thoracic Extension      Thoracic Sidebending         Thoracic Rotation           Strength     Date: 17     Cervical Myotomes/5 Right Left Right Left Right Left   Cervical Flexion (C1-2)         Cervical Sidebending (C3)         Shoulder Elevation (C4) 5- 5       Shoulder " Abduction (C5) 5- 5       Elbow Flexion (C6) 5 5       Elbow Extension (C7) 5 5       Wrist Flexion (C7)         Wrist Extension (C6)         Thumb abduction (C8)         Finger Abduction (T1)           Sensation: not impaired  Flexibility: general tightness in R neck musculature, mild stiffness with R shoulder elevation (WFL)    Palpation: mild tenderness to R upper trap and posterior shoulder    Passive Mobility-Joint Integrity: WNL.    Cervical Special Tests     Cervical Special Tests Right Left UE Nerve Mobility Right Left   Cervical compression - - Median nerve     Cervical distraction   Ulnar nerve     Spurling s test - - Radial nerve     Shoulder abduction sign   Thoracic outlet     Deep neck flexor endurance test   Anderson     Upper cervical rotation   Adson s     Sharper-Shelby   Cervical rotation lateral flexion     Alar ligament test   Other:     Other:   Other:       Shoulder Special Tests  Impingement Cluster Right (+/-) Left (+/-) Rotator Cuff Tests Right (+/-) Left (+/-)   Doran-Marko -  Drop Arm Sign     Painful Arc -  Hornblowers     Infraspinatus Test -  ERLS     AC Tests Right (+/-) Left (+/-) IRLS     Active Compression   Labral Tests Right (+/-) Left (+/-)   Crossbody Adduction   Biceps Load Test II     AC Resisted Extension   Jerk Test     GH Instability Tests Right (+/-) Left (+/-) Nivia Test     Sulcus Sign   Biceps Tests Right (+/-) Left (+/-)   Apprehension   Speed     Relocation   Rick cyr     Other:   Other:     Other:   Other:         UE Screen: R sided neck tightness, stiffness in R shoulder, pec tightness    Treatment Today     TREATMENT MINUTES COMMENTS   Evaluation 30    Self-care/ Home management     Manual therapy 10 In supine:  - STM/MFR to R upper traps, levator, scalenes, and paraspinals   Neuromuscular Re-education     Therapeutic Activity     Therapeutic Exercises 15 See exercises for HEP. Pt educated on use of tennis ball for TPR. Also, discussed future POC.   Gait training      Modality__________________                Total 55    Blank areas are intentional and mean the treatment did not include these items.     PT Evaluation Code: (Please list factors)  Patient History/Comorbidities: none  Examination: R neck stiffness and pain, R shoulder pain, abnormal posture, weakness  Clinical Presentation: stable  Clinical Decision Making: low    Patient History/  Comorbidities Examination  (body structures and functions, activity limitations, and/or participation restrictions) Clinical Presentation Clinical Decision Making (Complexity)   No documented Comorbidities or personal factors 1-2 Elements Stable and/or uncomplicated Low   1-2 documented comorbidities or personal factor 3 Elements Evolving clinical presentation with changing characteristics Moderate   3-4 documented comorbidities or personal factors 4 or more Unstable and unpredictable High          Ian Moe, PT, DPT  7/26/2017  4:02 PM

## 2021-06-13 NOTE — PROGRESS NOTES
[facility] Discharge Summary  Patient Name: Mike Vernonkinoreen  Date: 9/25/2017  Referral Diagnosis:   840.9 (ICD-9-CM) - S46.919A (ICD-10-CM) - Shoulder strain   847.0 (ICD-9-CM) - S16.1XXA (ICD-10-CM) - Neck strain     Referring provider: Ailyn Atkinson MD  Visit Diagnosis:   1. Neck pain on right side     2. Acute pain of right shoulder     3. Neck stiffness         Goals:  Pt will: be independent with HEP within 3 weeks. (Goal Met)  Pt will: be able to return to playing bass without pain within 6 weeks. (Not Met)  Pt will: decrease SPADI by 9 points to demonstrate improved function within 8 weeks. (Not Met)  Pt will: be able to lift arms overhead without pain for work function within 6 weeks. (Improving)    Patient was seen for 4 visits from initial evaluation to 8/23/17 with 0 missed appointments.  The patient attended therapy initially, but did not finish the therapy sessions prescribed.  Goals were not fully achieved. Explanation for goals not achieved: did not return  The patient was instructed to follow up with physician's clinic.  Patient received a home program   The patient discontinued therapy, did not return.  No further therapy is required at this time.  Pt continues to have weakenss in the shoulder, but less overall pain. If weakness does not improve, recommend pt to return to referring provider.    Therapy will be discontinued at this time.  The patient will need a new referral to resume.    Thank you for your referral.  Ian Moe, PT, DPT  9/25/2017  12:53 PM

## 2021-06-14 NOTE — PROGRESS NOTES
Assessment/Plan:     1. Annual physical exam  2. Left testicular pain  3. Weakness of right upper extremity  4. Neck pain  - No abnormality on exam noted, low suspicion for testicular torsion and he was reassured of the same. Will advise scrotal support and will check ultrasound tomorrow to rule out pathology   - MR Cervical Spine Without Contrast; Future. If any abnormalities will refer to surgery given weakness in right hand and scapular winging   -I recommended he eat a healthy diet and exercise on a regular basis      Subjective:     Mike Rhodes is a 37 y.o. male who presents for an annual exam. He does have several concern outside of the physical that he would like to review today.     The first is left testicular pain. This started last weekend. He was in his usual state of health on Saturday when he went to bed but awoke early Sunday morning with left testicular pain. He had to walk cautiously due to the pain and felt nauseated. The pain is better now and more intermittent but not entirely resolved. He denies any recent or history of testicular trauma. No concern for STI, he is  with only 1 partner. No dysuria or hematuria.     He has a history of sudden right neck and shoulder pain in July of this year. He states that he awoke with the pain and does not recall any injury or change in his usual activities. 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 He was seen in [facility], NSAID and muscle relaxer were prescribed. The pain resolved for a while particularly with rest, however, he again awoke with pain and tightness in the right neck and upper pack to the shoulder. He was given prednisone and referred to physical therapy. At the current time he has no pain in the neck or shoulder, however, he has weakness in the shoulder and hands. He plays the bass, he is not able to keep up with fast songs the way he used to be able to. He was previously lifting weights  regularly and can no longer bench press due to the weakness in his hand and arm on the right side. He is able to do push-ups but fatigues more quickly than before.     We reviewed his history of asthma. This was previously exercise induced only. He is now able to exercise without respiratory symptoms.     The patient reports that there is not domestic violence in his life.     Healthy Habits:   Regular Exercise: Yes  Sunscreen Use: Yes  Healthy Diet: Yes  Dental Visits Regularly: Yes  Sexually active: Yes      Immunization History   Administered Date(s) Administered     Influenza,seasonal quad, PF, 36+MOS 12/14/2017     Tdap 12/31/2014     Immunization status: influenza vaccine today         Current Outpatient Prescriptions   Medication Sig Dispense Refill     ibuprofen (ADVIL,MOTRIN) 800 MG tablet Take 1 tablet (800 mg total) by mouth every 6 (six) hours as needed for pain. 30 tablet 0     cyclobenzaprine (FLEXERIL) 5 MG tablet Take 1 tablet (5 mg total) by mouth 3 (three) times a day as needed for muscle spasms. 30 tablet 0     predniSONE (DELTASONE) 10 mg tablet Take 40mg by mouth daily for 3 days, then 30mg x 3 days, then 20mg x 3 days ,then 10mg x 3 days then stop 30 tablet 0     No current facility-administered medications for this visit.      Past Medical History:   Diagnosis Date     Asthma      History reviewed. No pertinent surgical history.  Review of patient's allergies indicates no known allergies.  Family History   Problem Relation Age of Onset     Alcohol abuse Father      History of ETOH issues.  Now abstinent.     No Medical Problems Brother      No Medical Problems Daughter      Colon cancer Maternal Uncle      Social History     Social History     Marital status:      Spouse name: N/A     Number of children: 2     Years of education: N/A     Occupational History     Appliance Paper Technician       Social History Main Topics     Smoking status: Never Smoker     Smokeless tobacco: Never Used      Alcohol use No      Comment: Never a problem, but there are other family members who have had issues.     Drug use: No     Sexual activity: Yes     Partners: Female     Other Topics Concern     Not on file     Social History Narrative       Review of Systems  General:  Negative except as noted above  Eyes: Negative except as noted above  Ears/Nose/Throat: Negative except as noted above  Cardiovascular: Negative except as noted above  Respiratory:  Negative except as noted above  Gastrointestinal:  Negative except as noted above  Musculoskeletal:  Negative except as noted above  Skin: Negative except as noted above  Neurologic: Negative except as noted above  Psychiatric: Negative except as noted above  Endocrine: Negative except as noted above  Heme/Lymphatic: Negative except as noted above   Allergic/Immunologic: Negative except as noted above      Objective:      Vitals:    12/14/17 1540   BP: 124/74   Pulse: 68   Weight: (!) 225 lb (102.1 kg)     Wt Readings from Last 3 Encounters:   12/14/17 (!) 225 lb (102.1 kg)   07/21/17 (!) 225 lb 9.6 oz (102.3 kg)   07/14/17 221 lb 4.8 oz (100.4 kg)     Body mass index is 31.38 kg/(m^2).     Physical Exam:  Constitutional: Well developed, well nourished, no acute distress.  HEENT: normocephalic/atraumatic, PERRLA/EOMI, TMs: Gray, normal light reflex, no nasal discharge.  Oral mucosa: moist; no erythema/exudate  Neck: No LAD/masses/thyromegaly/bruits  Lungs: clear bilaterally  Heart: regular rate and rhythm, no murmurs/gallops/rubs  Abdomen: Normal bowel sounds, soft, non-tender, non-distended, no masses, neg Ruff's/McBurney's, no rebound/guarding  Genital: Bilaterally descended testes, no masses, non-tender to palpation today; unable to elicit pain on exam, no hernia.  No urethral discharge or erythema.  No lesions, normal circumcised phallus. Exam chaperoned by Rachael Hernandez MA  Lymphatics: no supraclavicular/cervical/inguinal LAD. No edema.  Neuro: Upper  extremity sensory intact to light touch.  Musculoskeletal: Neck: No spinous process tenderness or paraspinal tenderness. Normal range of motion, no pain with movement.   He has good range of motion in both shoulders bilaterally. Strength in the shoulder is 5/5, right hand  strength 5/5 and symmetrical. There is winging of the medial border of the left scapula with flexion of the shoulders.   Skin: no rashes or lesions. Right arm without edema/erythema.   Psych: Behavior appropriate, engaging.  Thought processes congruent, non-tangential

## 2021-06-16 PROBLEM — M54.12 CERVICAL RADICULOPATHY AT C6: Status: ACTIVE | Noted: 2018-08-28

## 2021-06-16 PROBLEM — M54.2 CERVICAL PAIN: Status: ACTIVE | Noted: 2018-08-28

## 2021-06-16 NOTE — PROGRESS NOTES
Internal Medicine Office Visit  [facility]   Patient Name: Mike Rhodes  Patient Age: 41 y.o.  YOB: 1980  MRN: [id number]    Date of Visit: 3/23/2021  Reason for Office Visit:   Chief Complaint   Patient presents with     Follow-up           Assessment / Plan / Medical Decision Making:    Problem List Items Addressed This Visit     Benign essential hypertension     stable         Relevant Medications    hydroCHLOROthiazide (HYDRODIURIL) 25 MG tablet    Xerodermia - dry skin - cracked/fissured feet in the winter     Advised foot soaks, vaseline based emollient                 I am having Navneet Rhodes maintain his hydroCHLOROthiazide.                No orders of the defined types were placed in this encounter.  Followup: Return in about 6 months (around 9/23/2021) for Annual physical. earlier if needed.        Mariza Nunez, TWIN        HPI:  Mike Rhodes is a 41 y.o. year old who presents to the office today for follow up.     He notes a tightness in the backs of the legs since he ran out of the hydrochlorothiazide medication. Otherwise, no chest pain, dyspnea. No lightheadedness associated with HTN medication.     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 He has added a house humidifier. If he uses lotion consistently it is better.     Health Maintenance Review  Health Maintenance   Topic Date Due     HEPATITIS C SCREENING  Never done     Pneumococcal Vaccine: Pediatrics (0 to 5 Years) and At-Risk Patients (6 to 64 Years) (1 of 2 - PPSV23) Never done     ADVANCE CARE PLANNING  05/19/2020     ASTHMA ACTION PLAN  10/20/2021     PREVENTIVE CARE VISIT  10/20/2021     Asthma Control Test  03/26/2022     TD 18+ HE  12/31/2024     LIPID  09/09/2025     INFLUENZA VACCINE RULE BASED  Completed     TDAP ADULT ONE TIME DOSE  Completed     HEPATITIS B VACCINES  Aged Out     HIV SCREENING  Discontinued       Current Scheduled  Meds:  Outpatient Encounter Medications as of 3/23/2021   Medication Sig Dispense Refill     hydroCHLOROthiazide (HYDRODIURIL) 25 MG tablet Take 1 tablet (25 mg total) by mouth daily. 90 tablet 2     [DISCONTINUED] hydroCHLOROthiazide (HYDRODIURIL) 25 MG tablet Take 1 tablet (25 mg total) by mouth daily. 90 tablet 1     No facility-administered encounter medications on file as of 3/23/2021.            Objective / Physical Examination:  Vitals:    03/23/21 1534   BP: 118/72   Pulse: 70   Weight: 196 lb (88.9 kg)     Wt Readings from Last 3 Encounters:   03/23/21 196 lb (88.9 kg)   10/20/20 203 lb (92.1 kg)   08/25/20 209 lb (94.8 kg)     Body mass index is 27.34 kg/m .     Constitutional: In no apparent distress  Respiratory: Clear to auscultation bilaterally. Normal inspiratory and expiratory effort  Cardiovascular: Regular rate and rhythm. No murmurs, rubs, or gallops. No edema. No carotid bruits.

## 2021-06-19 NOTE — PROGRESS NOTES
Chief Complaint   Patient presents with     persistent pain     Happened once last year with the similar pain. This started a couple days ago with a stiff neck. Pain starts from the right neck, and radiates under the right shoulder blade, arms and back. When it happenes he notices that he looses his strength with .           HPI    Patient is here for a few days of right posterior shoulder pain radiating to right hand. Symptoms started with stiffness of the neck. Now the neck stiffness is much better. Pain starts from right shoulder blade, posterior shoulder and radiates down right arm. No tingling nor numbness of right hand and arm. No weakness of right arm. No fever, chills. No recent injury. He had a similar episode last yr and had PT, as well as was recommended for MRI study but has not done the MRI yet.    ROS: Pertinent ROS noted in HPI.     No Known Allergies    Patient Active Problem List   Diagnosis     Exercise-induced asthma - feels that an MDI did not help - runs now without issues     Xerodermia - dry skin - cracked/fissured feet in the winter       Family History   Problem Relation Age of Onset     Alcohol abuse Father      History of ETOH issues.  Now abstinent.     No Medical Problems Brother      No Medical Problems Daughter      Colon cancer Maternal Uncle        Social History     Social History     Marital status:      Spouse name: N/A     Number of children: 2     Years of education: N/A     Occupational History     Appliance Paper Technician       Social History Main Topics     Smoking status: Never Smoker     Smokeless tobacco: Never Used     Alcohol use No      Comment: Never a problem, but there are other family members who have had issues.     Drug use: No     Sexual activity: Yes     Partners: Female     Other Topics Concern     Not on file     Social History Narrative         Objective:    Vitals:    08/14/18 1522   BP: 120/80   Pulse: 81   Resp: 20   Temp: 98  F (36.7  C)    SpO2: 97%       Gen:NAD  CV: RRR, no M, R, G  Pulm: CTAB  MSK: normal inspection of upper extremities, shoulders, and back. Mild tenderness to palpation at R proximal upper arm, and right side of neck. Normal palpation of back, shoulders bilaterally. Full ROM and 55/ strength of bilateral upper extremities.   Neuro: intact and symmetric gross sensation of bilateral upper extremities.      Radicular pain of shoulder  -     predniSONE (DELTASONE) 20 MG tablet; Take 60 mg once daily for three days, then 40 mg once daily for three days, then 20 mg once daily for three days.      Reassuring exam. Recommended f/u with PCP to re-consider MR cervical spine.

## 2021-06-19 NOTE — PROGRESS NOTES
Internal Medicine Office Visit  [facility]  Patient Name: Mike Rhodes  Patient Age: 38 y.o.  YOB: 1980  MRN: [id number]    Date of Visit: 2018  Reason for Office Visit:   Chief Complaint   Patient presents with     Follow-up     [facility]           Assessment / Plan / Medical Decision Makin. Neck pain  2. 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  He is advised that he cannot drive after taking this medication  - Offered Flexeril, he declines at this time  -  Will have a low threshold to refer to neurosurgery for further evaluation and management due to the right arm weakness and pain which has recurred since 2017          Health Maintenance Review  Health Maintenance   Topic Date Due     ASTHMA CONTROL TEST  1980     ASTHMA FOLLOW-UP  1980     INFLUENZA VACCINE RULE BASED (1) 2018     ADVANCE DIRECTIVES DISCUSSED WITH PATIENT  2020     TD 18+ HE  2024     TDAP ADULT ONE TIME DOSE  Completed         I am having Mr. Rhodes maintain his predniSONE and predniSONE.      HPI:  Mike Rhodes is a 38 y.o. year old who presents to the office today for follow-up of neck and shoulder pain.  Was recently seen in walk-in care on  for similar symptoms.  He states that he had a show with his have a metal band and was head banging.  Approximately 12 days later he started to have stiffness in the right side of the neck.  He then started to have pain which radiated down into the shoulder.  He has less strength in the right shoulder and extremity.  He has noticed that he is unable to bench press do to this weakness.  He has a dull pain in the right  posterior shoulder.  He has been treating this with ice.  He wakes up in the middle the night due to the discomfort and cannot sleep typically between the hours of 2 AM to 4 AM.  The pain was worse yesterday and today.  In the walk-in care visit he was given prednisone and Advil.  He has seen mild improvement in the pain but not sufficient to allow him to sleep through the night.  He does have a history of hockey and backyard football play resulting in various non-specific injuries in the past.  He was seen for similar symptoms in December 2017 and an MRI of the neck was ordered at that time due to weakness in the right hand.  Unfortunately, due to the concerns about the cost he never proceeded with the MRI and his symptoms actually improved with prolonged continuation of the physical therapy exercises that he learned prior to this.      Review of Systems- pertinent positive in bold:  A 10-point ROS is negative except as stated in HPI         Current Scheduled Meds:  Outpatient Encounter Prescriptions as of 8/16/2018   Medication Sig Dispense Refill     predniSONE (DELTASONE) 10 mg tablet Take 40mg by mouth daily for 3 days, then 30mg x 3 days, then 20mg x 3 days ,then 10mg x 3 days then stop 30 tablet 0     predniSONE (DELTASONE) 20 MG tablet Take 60 mg once daily for three days, then 40 mg once daily for three days, then 20 mg once daily for three days. 18 tablet 0     [DISCONTINUED] cyclobenzaprine (FLEXERIL) 5 MG tablet Take 1 tablet (5 mg total) by mouth 3 (three) times a day as needed for muscle spasms. 30 tablet 0     [DISCONTINUED] HYDROcodone-acetaminophen 5-325 mg per tablet Take 1 tablet by mouth every 4 (four) hours as needed for pain. 15 tablet 0     No facility-administered encounter medications on file as of 8/16/2018.      Past Medical History:   Diagnosis Date     Asthma      History reviewed. No pertinent surgical history.  Social History   Substance Use Topics     Smoking status: Never Smoker      Smokeless tobacco: Never Used     Alcohol use No      Comment: Never a problem, but there are other family members who have had issues.       Objective / Physical Examination:  Vitals:    08/16/18 1423   BP: 136/76   Pulse: 80   Weight: 218 lb (98.9 kg)     Wt Readings from Last 3 Encounters:   08/16/18 218 lb (98.9 kg)   08/14/18 220 lb 11.2 oz (100.1 kg)   12/14/17 (!) 225 lb (102.1 kg)     Body mass index is 30.4 kg/(m^2).     General Appearance: Cooperative, affect appropriate, speech clear, in no apparent distress  Lungs: Clear to auscultation bilaterally. Normal inspiratory and expiratory effort  Cardiovascular: Regular rate, normal S1, S2. No murmurs, rubs, or gallops  MSK: Neck and shoulders appear atraumatic.  No spinous process tenderness.  He has mild tenderness to palpation of the right proximal upper arm and right side of the neck.  There is winging of the medial border of the left scapula with flexion of the shoulders. He has full extension and flexion of the neck but difficulty with rotation to the right and this is limited as compared to the left.  He has full upper extremity strength and range of motion  Extremities: Radial pulses 2+  Neuro: Alert and oriented x3. No perceptible difference in upper extremity strength.  Sensation in hands intact and symmetrical    Orders Placed This Encounter   Procedures     MR Cervical Spine Without Contrast   Followup: Return if symptoms worsen or fail to improve. earlier if needed.        Mariza Nunez, CNP

## 2021-06-20 NOTE — PROGRESS NOTES
NEUROSURGERY CONSULTATION NOTE:    Assessment:    1. Cervical nerve root compression  OPS TFESI C/T Spine Unilateral        Plan: I have ordered some additional conservative management.  He has not had any treatment with this flare.  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  He should come back in about 4 weeks for further recommendations.    [facility]   [address] [place] [id number]  38 y.o. male is sent to me in consultation   by NERI Smith     CC:    Chief Complaint   Patient presents with     Neck Pain       HPI:  Neurosurgery consultation was requested by: Dr. Mariza Nunez   Pain: Neck pain   Radicular Pain is present: radiates into right shoulder, tricept and forearm   Lhermitte sign: NO  Motor complaints: Denies weakness  Sensory complaints: Numbness in thumb and pointer finger on right hand on and off   Gait and balance issues: Denies   Bowel or bladder issues: Denies   Duration of SX is: Having flare ups for 1 year, recently started 3-4 weeks ago.   The symptoms are worse with: Activity   The symptoms are better with: Gabapentin   Injury: Not sure, pt is in a band   Severity is: Mild - moderate  Patient has tried the following conservative measures: Not recently. Did PT year ago for last flare up and did help      PROBLEM LIST:  1. Cervical nerve root compression           REVIEW OF SYSTEMS:  A 12 point review of systems is negative other than those symptoms listed above.      Past Medical History:   Diagnosis Date     Asthma      Cervical pain 8/28/2018     Cervical radiculopathy at C6 8/28/2018     Exercise-induced asthma - feels that an MDI did not help - runs now without issues 5/19/2015     Xerodermia - dry skin - cracked/fissured feet in the winter 5/19/2015         Past Surgical History:   Procedure Laterality Date     NO PAST SURGERIES           MEDICATIONS:  Current Outpatient Prescriptions   Medication Sig Dispense Refill     cyclobenzaprine  "(FLEXERIL) 5 MG tablet Take 1 tablet (5 mg total) by mouth 3 (three) times a day as needed for muscle spasms. 30 tablet 0     gabapentin (NEURONTIN) 300 MG capsule Take 1 capsule (300 mg total) by mouth 2 (two) times a day. 60 capsule 0     HYDROcodone-acetaminophen 5-325 mg per tablet Take 1-2 tablets by mouth every 4 (four) hours as needed for pain. 30 tablet 0     No current facility-administered medications for this visit.          ALLERGIES/SENSITIVITIES:     No Known Allergies    PERTINENT SOCIAL HISTORY:   Social History     Social History     Marital status:      Spouse name: N/A     Number of children: 2     Years of education: N/A     Occupational History     Appliance Paper Technician       Social History Main Topics     Smoking status: Never Smoker     Smokeless tobacco: Never Used     Alcohol use No      Comment: Never a problem, but there are other family members who have had issues.     Drug use: No     Sexual activity: Yes     Partners: Female       FAMILY HISTORY:  Family History   Problem Relation Age of Onset     Alcohol abuse Father      History of ETOH issues.  Now abstinent.     No Medical Problems Brother      No Medical Problems Daughter      Colon cancer Maternal Uncle         PHYSICAL EXAM:   Constitutional: /83  Pulse 90  Ht 5' 11\" (1.803 m)  Wt 218 lb (98.9 kg)  SpO2 98%  BMI 30.4 kg/m2    General appearance: Appropriately groomed.  No acute distress.  Interactive.     Mental Status: Mental status: Alert and oriented, mood and affect appropriate, language reception and expression normal, recent and remote memory is normal, higher cortical function normal. Attention span, concentration and ability to follow commands is normal.       Cranial Nerves: Face is symmetric.  Extraocular movements are full, conjugate and without nystagmus.  Hearing is preserved.  Shoulder position is symmetric.  Tongue is midline with normal motion.  Palate elevates symmetrically.     Motor: " Motor exam nl bilateral UE, to confrontation test. Tone nl, bulk nl and strength 5/5 all groups.      Sensory: Sensory exam by subjective report intact to LT,PP,Position and Vib. in the UE and  LE, with the exception of some alteration in a distal right C6.     Station and Gait:  Station and Gait- nl stride length,  balance and nelda..     Reflexes; depressed right biceps reflex and brachioradialis.    IMAGING:  I have personally reviewed the images and discussed the findings with Mike Rhodes.  He has degenerative disc disease at C5-6, most notably.  He has a rightward herniation/disc osteophyte complex that does compress the right C6 nerve root.    CC:     Mariza Nunez, [id number] [place] [address]

## 2021-06-20 NOTE — PROGRESS NOTES
Neurosurgery consultation was requested by: Dr. Mariza Nunez   Pain: Neck pain   Radicular Pain is present: radiates into right shoulder, tricept and forearm   Lhermitte sign: NO  Motor complaints: Denies weakness  Sensory complaints: Numbness in thumb and pointer finger on right hand on and off   Gait and balance issues: Denies   Bowel or bladder issues: Denies   Duration of SX is: Having flare ups for 1 year, recently started 3-4 weeks ago.   The symptoms are worse with: Activity   The symptoms are better with: Gabapentin   Injury: Not sure, pt is in a band   Severity is: Mild - moderate  Patient has tried the following conservative measures: Not recently. Did PT year ago for last flare up and did help  NDI score is :   KHOA Downey

## 2021-10-11 ENCOUNTER — HEALTH MAINTENANCE LETTER (OUTPATIENT)
Age: 41
End: 2021-10-11

## 2021-11-10 ENCOUNTER — OFFICE VISIT (OUTPATIENT)
Dept: INTERNAL MEDICINE | Facility: CLINIC | Age: 41
End: 2021-11-10
Payer: COMMERCIAL

## 2021-11-10 VITALS
SYSTOLIC BLOOD PRESSURE: 110 MMHG | HEIGHT: 70 IN | HEART RATE: 72 BPM | BODY MASS INDEX: 28.2 KG/M2 | DIASTOLIC BLOOD PRESSURE: 72 MMHG | WEIGHT: 197 LBS | OXYGEN SATURATION: 100 %

## 2021-11-10 DIAGNOSIS — I10 BENIGN ESSENTIAL HYPERTENSION: ICD-10-CM

## 2021-11-10 DIAGNOSIS — Z00.00 ANNUAL PHYSICAL EXAM: Primary | ICD-10-CM

## 2021-11-10 LAB
ANION GAP SERPL CALCULATED.3IONS-SCNC: 12 MMOL/L (ref 5–18)
BUN SERPL-MCNC: 16 MG/DL (ref 8–22)
CALCIUM SERPL-MCNC: 9.7 MG/DL (ref 8.5–10.5)
CHLORIDE BLD-SCNC: 106 MMOL/L (ref 98–107)
CHOLEST SERPL-MCNC: 196 MG/DL
CO2 SERPL-SCNC: 21 MMOL/L (ref 22–31)
CREAT SERPL-MCNC: 0.92 MG/DL (ref 0.7–1.3)
FASTING STATUS PATIENT QL REPORTED: NO
GFR SERPL CREATININE-BSD FRML MDRD: >90 ML/MIN/1.73M2
GLUCOSE BLD-MCNC: 95 MG/DL (ref 70–125)
HDLC SERPL-MCNC: 47 MG/DL
LDLC SERPL CALC-MCNC: 124 MG/DL
POTASSIUM BLD-SCNC: 4.6 MMOL/L (ref 3.5–5)
SODIUM SERPL-SCNC: 139 MMOL/L (ref 136–145)
TRIGL SERPL-MCNC: 123 MG/DL

## 2021-11-10 PROCEDURE — 36415 COLL VENOUS BLD VENIPUNCTURE: CPT | Performed by: NURSE PRACTITIONER

## 2021-11-10 PROCEDURE — 99396 PREV VISIT EST AGE 40-64: CPT | Mod: 25 | Performed by: NURSE PRACTITIONER

## 2021-11-10 PROCEDURE — 90471 IMMUNIZATION ADMIN: CPT | Performed by: NURSE PRACTITIONER

## 2021-11-10 PROCEDURE — 90686 IIV4 VACC NO PRSV 0.5 ML IM: CPT | Performed by: NURSE PRACTITIONER

## 2021-11-10 PROCEDURE — 80048 BASIC METABOLIC PNL TOTAL CA: CPT | Performed by: NURSE PRACTITIONER

## 2021-11-10 PROCEDURE — 80061 LIPID PANEL: CPT | Performed by: NURSE PRACTITIONER

## 2021-11-10 SDOH — ECONOMIC STABILITY: INCOME INSECURITY: IN THE LAST 12 MONTHS, WAS THERE A TIME WHEN YOU WERE NOT ABLE TO PAY THE MORTGAGE OR RENT ON TIME?: NO

## 2021-11-10 SDOH — ECONOMIC STABILITY: TRANSPORTATION INSECURITY
IN THE PAST 12 MONTHS, HAS LACK OF TRANSPORTATION KEPT YOU FROM MEETINGS, WORK, OR FROM GETTING THINGS NEEDED FOR DAILY LIVING?: NO

## 2021-11-10 SDOH — ECONOMIC STABILITY: FOOD INSECURITY: WITHIN THE PAST 12 MONTHS, YOU WORRIED THAT YOUR FOOD WOULD RUN OUT BEFORE YOU GOT MONEY TO BUY MORE.: NEVER TRUE

## 2021-11-10 SDOH — ECONOMIC STABILITY: INCOME INSECURITY: HOW HARD IS IT FOR YOU TO PAY FOR THE VERY BASICS LIKE FOOD, HOUSING, MEDICAL CARE, AND HEATING?: NOT HARD AT ALL

## 2021-11-10 SDOH — ECONOMIC STABILITY: TRANSPORTATION INSECURITY
IN THE PAST 12 MONTHS, HAS THE LACK OF TRANSPORTATION KEPT YOU FROM MEDICAL APPOINTMENTS OR FROM GETTING MEDICATIONS?: NO

## 2021-11-10 SDOH — HEALTH STABILITY: PHYSICAL HEALTH: ON AVERAGE, HOW MANY MINUTES DO YOU ENGAGE IN EXERCISE AT THIS LEVEL?: 30 MIN

## 2021-11-10 SDOH — ECONOMIC STABILITY: FOOD INSECURITY: WITHIN THE PAST 12 MONTHS, THE FOOD YOU BOUGHT JUST DIDN'T LAST AND YOU DIDN'T HAVE MONEY TO GET MORE.: NEVER TRUE

## 2021-11-10 SDOH — HEALTH STABILITY: PHYSICAL HEALTH: ON AVERAGE, HOW MANY DAYS PER WEEK DO YOU ENGAGE IN MODERATE TO STRENUOUS EXERCISE (LIKE A BRISK WALK)?: 5 DAYS

## 2021-11-10 ASSESSMENT — SOCIAL DETERMINANTS OF HEALTH (SDOH)
HOW OFTEN DO YOU ATTEND CHURCH OR RELIGIOUS SERVICES?: NEVER
IN A TYPICAL WEEK, HOW MANY TIMES DO YOU TALK ON THE PHONE WITH FAMILY, FRIENDS, OR NEIGHBORS?: ONCE A WEEK
HOW OFTEN DO YOU GET TOGETHER WITH FRIENDS OR RELATIVES?: ONCE A WEEK
DO YOU BELONG TO ANY CLUBS OR ORGANIZATIONS SUCH AS CHURCH GROUPS UNIONS, FRATERNAL OR ATHLETIC GROUPS, OR SCHOOL GROUPS?: NO

## 2021-11-10 ASSESSMENT — ASTHMA QUESTIONNAIRES
QUESTION_4 LAST FOUR WEEKS HOW OFTEN HAVE YOU USED YOUR RESCUE INHALER OR NEBULIZER MEDICATION (SUCH AS ALBUTEROL): NOT AT ALL
ACT_TOTALSCORE: 25
QUESTION_1 LAST FOUR WEEKS HOW MUCH OF THE TIME DID YOUR ASTHMA KEEP YOU FROM GETTING AS MUCH DONE AT WORK, SCHOOL OR AT HOME: NONE OF THE TIME
ACUTE_EXACERBATION_TODAY: NO
QUESTION_3 LAST FOUR WEEKS HOW OFTEN DID YOUR ASTHMA SYMPTOMS (WHEEZING, COUGHING, SHORTNESS OF BREATH, CHEST TIGHTNESS OR PAIN) WAKE YOU UP AT NIGHT OR EARLIER THAN USUAL IN THE MORNING: NOT AT ALL
QUESTION_5 LAST FOUR WEEKS HOW WOULD YOU RATE YOUR ASTHMA CONTROL: COMPLETELY CONTROLLED
QUESTION_2 LAST FOUR WEEKS HOW OFTEN HAVE YOU HAD SHORTNESS OF BREATH: NOT AT ALL

## 2021-11-10 ASSESSMENT — LIFESTYLE VARIABLES
HOW MANY STANDARD DRINKS CONTAINING ALCOHOL DO YOU HAVE ON A TYPICAL DAY: PATIENT DECLINED
HOW OFTEN DO YOU HAVE A DRINK CONTAINING ALCOHOL: NEVER
HOW OFTEN DO YOU HAVE SIX OR MORE DRINKS ON ONE OCCASION: NEVER

## 2021-11-10 ASSESSMENT — MIFFLIN-ST. JEOR: SCORE: 1809.84

## 2021-11-10 NOTE — PROGRESS NOTES
Assessment/Plan:     Problem List Items Addressed This Visit        Circulatory    Benign essential hypertension     Stable.  His blood pressure is lower than prior checks.  If it remains in this level in another 6 months, consider dose reduction of hydrochlorothiazide to 12.5 mg daily         Relevant Orders    Basic metabolic panel (Completed)    Lipid panel reflex to direct LDL Fasting (Completed)      Other Visit Diagnoses     Annual physical exam    -  Primary        - Plant-based diet and 150 minutes of physical activity per week recommended   - Health screenings and vaccines appropriate for age, biological gender,and risk factors reviewed and ordered as per this discussion       Subjective:     Mike Rhodes is a 41 year old male who presents for an annual exam.     There is family history of colon cancer screening, his uncle  of colon cancer which was potentially diagnosed in his 50s.  His father does not have a history of colon cancer and he is not aware of any history of polyposis.    He had a mosquito bite on the right forearm. Took a while to resolve, was a hard lump for a while.  He now has a slight lump in this area but it is not painful or bothersome.    Answers for HPI/ROS submitted by the patient on 11/10/2021  Frequency of exercise:: 2-3 days/week. 2 days per week goes to the gym and lifts then he rides a stationary bike. Walks with his family.   Getting at least 3 servings of Calcium per day:: Yes  Diet:: Low salt  Taking medications regularly:: Yes  Medication side effects:: None  Bi-annual eye exam:: NO  Dental care twice a year:: Yes  Sleep apnea or symptoms of sleep apnea:: None  Additional concerns today:: No  Duration of exercise:: 15-30 minutes      Immunization History   Administered Date(s) Administered     COVID-19,LYNNETTE,Pfizer (12+ Yrs) 2021, 2021     Influenza Vaccine IM > 6 months Valent IIV4 (Alfuria,Fluzone) 2017, 10/20/2020, 11/10/2021     MMR 1993      Tdap (Adacel,Boostrix) 12/31/2014           Current Outpatient Medications   Medication Sig Dispense Refill     hydroCHLOROthiazide (HYDRODIURIL) 25 MG tablet [HYDROCHLOROTHIAZIDE (HYDRODIURIL) 25 MG TABLET] Take 1 tablet (25 mg total) by mouth daily. 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  Family History   Problem Relation Age of Onset     No Known Problems Mother      Alcoholism Father         History of ETOH issues.  Now abstinent.     No Known Problems Brother      No Known Problems Daughter      Colon Cancer Paternal Uncle         identical twin to his father     Social History     Socioeconomic History     Marital status:      Spouse name: Not on file     Number of children: 2     Years of education: Not on file     Highest education level: Not on file   Occupational History     Not on file   Tobacco Use     Smoking status: Never Smoker     Smokeless tobacco: Never Used   Substance and Sexual Activity     Alcohol use: No     Comment: Alcoholic Drinks/day: Never a problem, but there are other family members who have had issues.     Drug use: No     Sexual activity: Yes     Partners: Female   Other Topics Concern     Not on file   Social History Narrative     Not on file     Social Determinants of Health     Financial Resource Strain: Low Risk      Difficulty of Paying Living Expenses: Not hard at all   Food Insecurity: No Food Insecurity     Worried About Running Out of Food in the Last Year: Never true     Ran Out of Food in the Last Year: Never true   Transportation Needs: No Transportation Needs     Lack of Transportation (Medical): No     Lack of Transportation (Non-Medical): No   Physical Activity: Sufficiently Active      "Days of Exercise per Week: 5 days     Minutes of Exercise per Session: 30 min   Stress: No Stress Concern Present     Feeling of Stress : Only a little   Social Connections: Socially Isolated     Frequency of Communication with Friends and Family: Once a week     Frequency of Social Gatherings with Friends and Family: Once a week     Attends Nondenominational Services: Never     Active Member of Clubs or Organizations: No     Attends Club or Organization Meetings: Not on file     Marital Status:    Intimate Partner Violence: Not on file   Housing Stability: Low Risk      Unable to Pay for Housing in the Last Year: No     Number of Places Lived in the Last Year: 1     Unstable Housing in the Last Year: No           Objective:      Vitals:    11/10/21 0847   BP: 110/72   Pulse: 72   SpO2: 100%   Weight: 89.4 kg (197 lb)   Height: 1.778 m (5' 10\")     Wt Readings from Last 3 Encounters:   11/10/21 89.4 kg (197 lb)   03/23/21 88.9 kg (196 lb)   10/20/20 92.1 kg (203 lb)       Body mass index is 28.27 kg/m .     Physical Exam:  Constitutional: Well developed, well nourished, no acute distress.  HEENT: normocephalic/atraumatic, EOMI, TMs: Gray, normal light reflex, no nasal discharge.  Oral mucosa: moist; no erythema/exudate  Neck: No LAD/masses/thyromegaly/bruits  Lungs: clear bilaterally  Heart: regular rate and rhythm, no murmurs/gallops/rubs  Abdomen: Normal bowel sounds, soft, non-tender, non-distended, no masses  Lymphatics: no supraclavicular/cervical LAD. No edema.  Neuro: A&O x 3  Musculoskeletal: no gross deformities.  Skin: no rashes or lesions.  Psych: Behavior appropriate, engaging.  Thought processes congruent, non-tangential        "

## 2021-11-10 NOTE — ASSESSMENT & PLAN NOTE
Stable.  His blood pressure is lower than prior checks.  If it remains in this level in another 6 months, consider dose reduction of hydrochlorothiazide to 12.5 mg daily

## 2021-11-11 ASSESSMENT — ASTHMA QUESTIONNAIRES: ACT_TOTALSCORE: 25

## 2022-04-26 DIAGNOSIS — I10 BENIGN ESSENTIAL HYPERTENSION: ICD-10-CM

## 2022-04-28 RX ORDER — HYDROCHLOROTHIAZIDE 25 MG/1
TABLET ORAL
Qty: 90 TABLET | Refills: 2 | Status: SHIPPED | OUTPATIENT
Start: 2022-04-28 | End: 2023-02-14

## 2022-04-28 NOTE — TELEPHONE ENCOUNTER
"Last Written Prescription Date:  3/23/2021  Last Fill Quantity: 90,  # refills: 2   Last office visit provider:  11/10/2021     Requested Prescriptions   Pending Prescriptions Disp Refills     hydrochlorothiazide (HYDRODIURIL) 25 MG tablet [Pharmacy Med Name: HYDROCHLOROTHIAZIDE 25 MG TAB] 90 tablet 2     Sig: TAKE 1 TABLET BY MOUTH EVERY DAY       Diuretics (Including Combos) Protocol Passed - 4/26/2022  9:39 AM        Passed - Blood pressure under 140/90 in past 12 months     BP Readings from Last 3 Encounters:   11/10/21 110/72   03/23/21 118/72   10/20/20 126/64                 Passed - Recent (12 mo) or future (30 days) visit within the authorizing provider's specialty     Patient has had an office visit with the authorizing provider or a provider within the authorizing providers department within the previous 12 mos or has a future within next 30 days. See \"Patient Info\" tab in inbasket, or \"Choose Columns\" in Meds & Orders section of the refill encounter.              Passed - Medication is active on med list        Passed - Patient is age 18 or older        Passed - Normal serum creatinine on file in past 12 months     Recent Labs   Lab Test 11/10/21  0944   CR 0.92              Passed - Normal serum potassium on file in past 12 months     Recent Labs   Lab Test 11/10/21  0944   POTASSIUM 4.6                    Passed - Normal serum sodium on file in past 12 months     Recent Labs   Lab Test 11/10/21  0944                      Shelly Angulo RN 04/28/22 3:41 PM    "

## 2022-07-18 ENCOUNTER — MYC MEDICAL ADVICE (OUTPATIENT)
Dept: FAMILY MEDICINE | Facility: CLINIC | Age: 42
End: 2022-07-18

## 2022-07-22 ASSESSMENT — ASTHMA QUESTIONNAIRES: ACT_TOTALSCORE: 25

## 2022-09-24 ENCOUNTER — HEALTH MAINTENANCE LETTER (OUTPATIENT)
Age: 42
End: 2022-09-24

## 2023-01-29 ENCOUNTER — HEALTH MAINTENANCE LETTER (OUTPATIENT)
Age: 43
End: 2023-01-29

## 2023-02-13 ENCOUNTER — TELEPHONE (OUTPATIENT)
Dept: INTERNAL MEDICINE | Facility: CLINIC | Age: 43
End: 2023-02-13
Payer: COMMERCIAL

## 2023-02-13 DIAGNOSIS — I10 BENIGN ESSENTIAL HYPERTENSION: ICD-10-CM

## 2023-02-13 NOTE — TELEPHONE ENCOUNTER
Medication Question or Refill    Contacts       Type Contact Phone/Fax    02/13/2023 05:13 PM CST Phone (Incoming) Navneet Rhodes (Self) [phone] (H)          What medication are you calling about (include dose and sig)?: hydrochlorothiazide (HYDRODIURIL) 25 MG tablet    Preferred Pharmacy:   [id number] - [place], [place] - [address] [place] [address]  Phone: [phone] Fax: [phone]      Controlled Substance Agreement on file:   CSA -- Patient Level:    CSA: None found at the patient level.       Who prescribed the medication?: PCP    Do you need a refill? Yes    When did you use the medication last? 2/13/2023    Patient offered an appointment? Yes: rescheduled for 3/20    Do you have any questions or concerns?  No      Could we send this information to you in [facility] or would you prefer to receive a phone call?:   No preference   Okay to leave a detailed message?: Yes at Cell number on file:    Telephone Information:   Mobile [phone]     .

## 2023-02-14 RX ORDER — HYDROCHLOROTHIAZIDE 25 MG/1
25 TABLET ORAL DAILY
Qty: 90 TABLET | Refills: 2 | Status: SHIPPED | OUTPATIENT
Start: 2023-02-14 | End: 2023-08-17

## 2023-03-19 ASSESSMENT — ASTHMA QUESTIONNAIRES
QUESTION_1 LAST FOUR WEEKS HOW MUCH OF THE TIME DID YOUR ASTHMA KEEP YOU FROM GETTING AS MUCH DONE AT WORK, SCHOOL OR AT HOME: NONE OF THE TIME
QUESTION_4 LAST FOUR WEEKS HOW OFTEN HAVE YOU USED YOUR RESCUE INHALER OR NEBULIZER MEDICATION (SUCH AS ALBUTEROL): NOT AT ALL
ACT_TOTALSCORE: 24
QUESTION_5 LAST FOUR WEEKS HOW WOULD YOU RATE YOUR ASTHMA CONTROL: COMPLETELY CONTROLLED
ACT_TOTALSCORE: 24
QUESTION_3 LAST FOUR WEEKS HOW OFTEN DID YOUR ASTHMA SYMPTOMS (WHEEZING, COUGHING, SHORTNESS OF BREATH, CHEST TIGHTNESS OR PAIN) WAKE YOU UP AT NIGHT OR EARLIER THAN USUAL IN THE MORNING: NOT AT ALL
QUESTION_2 LAST FOUR WEEKS HOW OFTEN HAVE YOU HAD SHORTNESS OF BREATH: ONCE OR TWICE A WEEK

## 2023-03-19 ASSESSMENT — ENCOUNTER SYMPTOMS
HEARTBURN: 0
DIZZINESS: 0
EYE PAIN: 0
CHILLS: 0
MYALGIAS: 0
JOINT SWELLING: 0
FEVER: 0
DIARRHEA: 0
DYSURIA: 0
PALPITATIONS: 0
ABDOMINAL PAIN: 0
ARTHRALGIAS: 0
HEMATURIA: 0
SORE THROAT: 0
COUGH: 0
NAUSEA: 0
CONSTIPATION: 0
HEMATOCHEZIA: 0
WEAKNESS: 0
PARESTHESIAS: 0
NERVOUS/ANXIOUS: 0
HEADACHES: 0
FREQUENCY: 0
SHORTNESS OF BREATH: 0

## 2023-03-20 ENCOUNTER — OFFICE VISIT (OUTPATIENT)
Dept: INTERNAL MEDICINE | Facility: CLINIC | Age: 43
End: 2023-03-20
Payer: COMMERCIAL

## 2023-03-20 VITALS
SYSTOLIC BLOOD PRESSURE: 119 MMHG | TEMPERATURE: 97.5 F | WEIGHT: 212.3 LBS | DIASTOLIC BLOOD PRESSURE: 86 MMHG | HEART RATE: 73 BPM | BODY MASS INDEX: 30.39 KG/M2 | OXYGEN SATURATION: 99 % | HEIGHT: 70 IN

## 2023-03-20 DIAGNOSIS — Z00.00 ROUTINE GENERAL MEDICAL EXAMINATION AT A HEALTH CARE FACILITY: Primary | ICD-10-CM

## 2023-03-20 DIAGNOSIS — M65.30 TRIGGER FINGER, ACQUIRED: ICD-10-CM

## 2023-03-20 DIAGNOSIS — I10 PRIMARY HYPERTENSION: ICD-10-CM

## 2023-03-20 DIAGNOSIS — J45.990 EXERCISE-INDUCED ASTHMA: ICD-10-CM

## 2023-03-20 LAB
ANION GAP SERPL CALCULATED.3IONS-SCNC: 12 MMOL/L (ref 7–15)
BUN SERPL-MCNC: 20.2 MG/DL (ref 6–20)
CALCIUM SERPL-MCNC: 9.7 MG/DL (ref 8.6–10)
CHLORIDE SERPL-SCNC: 102 MMOL/L (ref 98–107)
CREAT SERPL-MCNC: 1.13 MG/DL (ref 0.67–1.17)
DEPRECATED HCO3 PLAS-SCNC: 25 MMOL/L (ref 22–29)
GFR SERPL CREATININE-BSD FRML MDRD: 83 ML/MIN/1.73M2
GLUCOSE SERPL-MCNC: 95 MG/DL (ref 70–99)
POTASSIUM SERPL-SCNC: 4.3 MMOL/L (ref 3.4–5.3)
SODIUM SERPL-SCNC: 139 MMOL/L (ref 136–145)

## 2023-03-20 PROCEDURE — 99396 PREV VISIT EST AGE 40-64: CPT | Performed by: NURSE PRACTITIONER

## 2023-03-20 PROCEDURE — 99213 OFFICE O/P EST LOW 20 MIN: CPT | Mod: 25 | Performed by: NURSE PRACTITIONER

## 2023-03-20 PROCEDURE — 80048 BASIC METABOLIC PNL TOTAL CA: CPT | Performed by: NURSE PRACTITIONER

## 2023-03-20 PROCEDURE — 36415 COLL VENOUS BLD VENIPUNCTURE: CPT | Performed by: NURSE PRACTITIONER

## 2023-03-20 ASSESSMENT — ENCOUNTER SYMPTOMS
DIZZINESS: 0
NERVOUS/ANXIOUS: 0
HEADACHES: 0
MYALGIAS: 0
SHORTNESS OF BREATH: 0
CONSTIPATION: 0
PARESTHESIAS: 0
NAUSEA: 0
DIARRHEA: 0
SORE THROAT: 0
WEAKNESS: 0
EYE PAIN: 0
HEMATURIA: 0
CHILLS: 0
HEMATOCHEZIA: 0
FEVER: 0
DYSURIA: 0
ABDOMINAL PAIN: 0
PALPITATIONS: 0
FREQUENCY: 0
ARTHRALGIAS: 0
COUGH: 0
HEARTBURN: 0
JOINT SWELLING: 0

## 2023-03-20 ASSESSMENT — PAIN SCALES - GENERAL: PAINLEVEL: NO PAIN (0)

## 2023-03-20 NOTE — PROGRESS NOTES
SUBJECTIVE:   CC: Navneet is an 43 year old who presents for preventative health visit.     The left hand 5th digit has been triggering for the past 1 month. No treatments tried.     Patient has been advised of split billing requirements and indicates understanding: Yes  Healthy Habits:     Getting at least 3 servings of Calcium per day:  Yes    Bi-annual eye exam:  NO    Dental care twice a year:  Yes    Sleep apnea or symptoms of sleep apnea:  None    Diet:  Low salt    Frequency of exercise:  2-3 days/week    Duration of exercise:  15-30 minutes    Taking medications regularly:  Yes    PHQ-2 Total Score: 0    Additional concerns today:  Yes      Today's PHQ-2 Score:   PHQ-2 ( 1999 Pfizer) 3/19/2023   Q1: Little interest or pleasure in doing things 0   Q2: Feeling down, depressed or hopeless 0   PHQ-2 Score 0   Q1: Little interest or pleasure in doing things Not at all   Q2: Feeling down, depressed or hopeless Not at all   PHQ-2 Score 0       Have you ever done Advance Care Planning? (For example, a Health Directive, POLST, or a discussion with a medical provider or your loved ones about your wishes): No, advance care planning information given to patient to review.  Patient plans to discuss their wishes with loved ones or provider.      Social History     Tobacco Use     Smoking status: Never     Smokeless tobacco: Never   Substance Use Topics     Alcohol use: No     Comment: Alcoholic Drinks/day: Never a problem, but there are other family members who have had issues.       Alcohol Use 3/19/2023   Prescreen: >3 drinks/day or >7 drinks/week? No       Last PSA: No results found for: PSA    Reviewed orders with patient. Reviewed health maintenance and updated orders accordingly - Yes      Reviewed and updated as needed this visit by clinical staff   Tobacco  Allergies  Meds  Problems  Med Hx  Surg Hx  Fam Hx          Reviewed and updated as needed this visit by Provider   Tobacco  Allergies  Meds  Problems   "Med Hx  Surg Hx  Fam Hx             Review of Systems   Constitutional: Negative for chills and fever.   HENT: Negative for congestion, ear pain, hearing loss and sore throat.    Eyes: Negative for pain and visual disturbance.   Respiratory: Negative for cough and shortness of breath.    Cardiovascular: Negative for chest pain, palpitations and peripheral edema.   Gastrointestinal: Negative for abdominal pain, constipation, diarrhea, heartburn, hematochezia and nausea.   Genitourinary: Negative for dysuria, frequency, genital sores, hematuria, impotence, penile discharge and urgency.   Musculoskeletal: Negative for arthralgias, joint swelling and myalgias.   Skin: Negative for rash.   Neurological: Negative for dizziness, weakness, headaches and paresthesias.   Psychiatric/Behavioral: Negative for mood changes. The patient is not nervous/anxious.        OBJECTIVE:   /86 (BP Location: Right arm, Patient Position: Sitting, Cuff Size: Adult Regular)   Pulse 73   Temp 97.5  F (36.4  C) (Oral)   Ht 1.778 m (5' 10\")   Wt 96.3 kg (212 lb 4.8 oz)   SpO2 99%   BMI 30.46 kg/m       Wt Readings from Last 5 Encounters:   03/20/23 96.3 kg (212 lb 4.8 oz)   11/10/21 89.4 kg (197 lb)   03/23/21 88.9 kg (196 lb)   10/20/20 92.1 kg (203 lb)   08/25/20 94.8 kg (209 lb)       Physical Exam  GENERAL: healthy, alert and no distress  EYES: Eyes grossly normal to inspection, conjunctivae and sclerae normal  HENT: ear canals and TM's normal, mouth without ulcers or lesions  NECK: no adenopathy, no asymmetry, masses, or scars and thyroid normal to palpation  RESP: lungs clear to auscultation - no rales, rhonchi or wheezes  CV: regular rate and rhythm, normal S1 S2, no S3 or S4, no murmur, click or rub, no peripheral edema and peripheral pulses strong  ABDOMEN: soft, nontender, no hepatosplenomegaly, no masses and bowel sounds normal  MS: no gross musculoskeletal defects noted. Triggering left hand 5th digit.   SKIN: no " suspicious lesions or rashes  NEURO: Normal strength and tone, mentation intact and speech normal  PSYCH: mentation appears normal, affect normal/bright        ASSESSMENT/PLAN:     Problem List Items Addressed This Visit        Respiratory    Exercise-induced asthma - feels that an MDI did not help - runs now without issues     No concerns. He no longer requires a rescue inhaler before exercise             Circulatory    Primary hypertension     Stable          Relevant Orders    Basic metabolic panel (Completed)       Musculoskeletal and Integumentary    Trigger finger, acquired     Reviewed initial conservative measures including NSAID, ice, and finger brace-- he was provided with the latter today. If symptoms persist over the next 2-4 weeks, he can return for a corticosteroid injection         Other Visit Diagnoses     Routine general medical examination at a health care facility    -  Primary            COUNSELING:   Reviewed preventive health counseling, as reflected in patient instructions        He reports that he has never smoked. He has never used smokeless tobacco.      Mariza Nunez NP  [facility]

## 2023-03-20 NOTE — PATIENT INSTRUCTIONS
- Try taking Aleve 1 tablet twice daily x 10 days  - Wear the finger brace over the next 2-4 weeks as much as you can. Take off to shower or if you need full use of your hand.  - If no improvement in a month, schedule an appointment and I will do a cortisone injection       Preventive Health Recommendations  Male Ages 40 to 49    Yearly exam:             See your health care provider every year in order to  o   Review health changes.   o   Discuss preventive care.    o   Review your medicines if your doctor has prescribed any.  You should be tested each year for STDs (sexually transmitted diseases) if you re at risk.   Have a cholesterol test every 5 years.   Have a colonoscopy (test for colon cancer) if someone in your family has had colon cancer or polyps before age 50.   After age 45, have a diabetes test (fasting glucose). If you are at risk for diabetes, you should have this test every 3 years.    Talk with your health care provider about whether or not a prostate cancer screening test (PSA) is right for you.    Shots: Get a flu shot each year. Get a tetanus shot every 10 years.     Nutrition:  Eat at least 5 servings of fruits and vegetables daily.   Eat whole-grain bread, whole-wheat pasta and brown rice instead of white grains and rice.   Get adequate Calcium and Vitamin D.     Lifestyle  Exercise for at least 150 minutes a week (30 minutes a day, 5 days a week). This will help you control your weight and prevent disease.   Limit alcohol to one drink per day.   No smoking.   Wear sunscreen to prevent skin cancer.   See your dentist every six months for an exam and cleaning.

## 2023-03-21 ENCOUNTER — MYC MEDICAL ADVICE (OUTPATIENT)
Dept: INTERNAL MEDICINE | Facility: CLINIC | Age: 43
End: 2023-03-21
Payer: COMMERCIAL

## 2023-03-22 PROBLEM — M65.30 TRIGGER FINGER, ACQUIRED: Status: ACTIVE | Noted: 2023-03-22

## 2023-03-22 PROBLEM — I10 PRIMARY HYPERTENSION: Status: ACTIVE | Noted: 2020-10-19

## 2023-03-22 NOTE — ASSESSMENT & PLAN NOTE
Reviewed initial conservative measures including NSAID, ice, and finger brace-- he was provided with the latter today. If symptoms persist over the next 2-4 weeks, he can return for a corticosteroid injection

## 2023-04-17 ENCOUNTER — TELEPHONE (OUTPATIENT)
Dept: INTERNAL MEDICINE | Facility: CLINIC | Age: 43
End: 2023-04-17
Payer: COMMERCIAL

## 2023-04-17 NOTE — TELEPHONE ENCOUNTER
Order/Referral Request    Who is requesting: patient    Orders being requested: cortisone injection for trigger finger    Reason service is needed/diagnosis: trigger finger    When are orders needed by: asap    Has this been discussed with Provider: Yes    Does patient have a preference on a Group/Provider/Facility? Mariza Nunez    Does patient have an appointment scheduled?: No    Where to send orders: Place orders within Epic    Could we send this information to you in [facility] or would you prefer to receive a phone call?:   No preference   Okay to leave a detailed message?: Yes at Cell number on file:    Telephone Information:   Mobile [phone]

## 2023-04-27 ENCOUNTER — OFFICE VISIT (OUTPATIENT)
Dept: INTERNAL MEDICINE | Facility: CLINIC | Age: 43
End: 2023-04-27
Payer: COMMERCIAL

## 2023-04-27 DIAGNOSIS — M65.30 TRIGGER FINGER, ACQUIRED: Primary | ICD-10-CM

## 2023-04-27 PROCEDURE — 20550 NJX 1 TENDON SHEATH/LIGAMENT: CPT | Performed by: NURSE PRACTITIONER

## 2023-04-27 RX ORDER — TRIAMCINOLONE ACETONIDE 40 MG/ML
20 INJECTION, SUSPENSION INTRA-ARTICULAR; INTRAMUSCULAR ONCE
Status: COMPLETED | OUTPATIENT
Start: 2023-04-27 | End: 2023-04-27

## 2023-04-27 RX ADMIN — TRIAMCINOLONE ACETONIDE 20 MG: 40 INJECTION, SUSPENSION INTRA-ARTICULAR; INTRAMUSCULAR at 14:16

## 2023-04-27 ASSESSMENT — ANXIETY QUESTIONNAIRES
6. BECOMING EASILY ANNOYED OR IRRITABLE: NOT AT ALL
IF YOU CHECKED OFF ANY PROBLEMS ON THIS QUESTIONNAIRE, HOW DIFFICULT HAVE THESE PROBLEMS MADE IT FOR YOU TO DO YOUR WORK, TAKE CARE OF THINGS AT HOME, OR GET ALONG WITH OTHER PEOPLE: NOT DIFFICULT AT ALL
5. BEING SO RESTLESS THAT IT IS HARD TO SIT STILL: NOT AT ALL
GAD7 TOTAL SCORE: 1
2. NOT BEING ABLE TO STOP OR CONTROL WORRYING: NOT AT ALL
1. FEELING NERVOUS, ANXIOUS, OR ON EDGE: SEVERAL DAYS
GAD7 TOTAL SCORE: 1
7. FEELING AFRAID AS IF SOMETHING AWFUL MIGHT HAPPEN: NOT AT ALL
3. WORRYING TOO MUCH ABOUT DIFFERENT THINGS: NOT AT ALL

## 2023-04-27 ASSESSMENT — PATIENT HEALTH QUESTIONNAIRE - PHQ9
5. POOR APPETITE OR OVEREATING: NOT AT ALL
SUM OF ALL RESPONSES TO PHQ QUESTIONS 1-9: 0

## 2023-04-27 ASSESSMENT — PAIN SCALES - GENERAL: PAINLEVEL: NO PAIN (0)

## 2023-04-27 NOTE — PROGRESS NOTES
"  Assessment & Plan   Problem List Items Addressed This Visit        Musculoskeletal and Integumentary    Trigger finger, acquired - Primary    Relevant Medications    triamcinolone (KENALOG-40) injection 20 mg (Completed)      Trigger finger injection as noted below. He is advised to let me know if triggering does not resolve or recurs about 6 weeks following the procedure and injection could be repeated x 1. If there is no improvement, I would refer him to orthopedics.          BMI:   Estimated body mass index is 30.78 kg/m  as calculated from the following:    Height as of this encounter: 1.778 m (5' 10\").    Weight as of this encounter: 97.3 kg (214 lb 8 oz).       Mariza Nunez NP  [facility] MILTON Toth is a 43 year old, presenting for the following health issues:  Cortisone Shot        3/20/2023     3:42 PM   Additional Questions   Roomed by Cali MATSON   Accompanied by N/A     History of Present Illness       Reason for visit:  Trigger finger injection    He eats 0-1 servings of fruits and vegetables daily.He consumes 1 sweetened beverage(s) daily.He exercises with enough effort to increase his heart rate 20 to 29 minutes per day.  He exercises with enough effort to increase his heart rate 3 or less days per week.   He is taking medications regularly.             Objective    BP (!) 135/95 (BP Location: Right arm, Patient Position: Sitting, Cuff Size: Adult Regular)   Pulse 78   Temp 98.2  F (36.8  C) (Oral)   Resp 16   Ht 1.778 m (5' 10\")   Wt 97.3 kg (214 lb 8 oz)   SpO2 99%   BMI 30.78 kg/m    Body mass index is 30.78 kg/m .     Physical Exam   MS: left hand 5th digit with a painful, palpable nodule on the palmar aspect of the hand over the A1 pulley.       Procedure:  Left trigger 5th digit injection.    Indications:  Symptomatic triggering digit.    Description of procedure:  Patient given informed consent prior to proceeding with injection.  Patient agreed to proceed " knowing the risks and benefits.  The area near the tendon nodule was cleaned with alcohol.  A needle was introduced encountering the nodule.  It was then withdrawn until no resistance was encountered.  Using a combination of 20 mg of Kenalog and 0.2 ml of lidocaine, the area around the tendon was infiltrated.  Bandage applied and aftercare instructions given.  No immediate complications.

## 2023-04-28 VITALS
SYSTOLIC BLOOD PRESSURE: 119 MMHG | RESPIRATION RATE: 16 BRPM | BODY MASS INDEX: 30.71 KG/M2 | TEMPERATURE: 98.2 F | HEIGHT: 70 IN | HEART RATE: 78 BPM | WEIGHT: 214.5 LBS | DIASTOLIC BLOOD PRESSURE: 86 MMHG | OXYGEN SATURATION: 99 %

## 2023-07-22 ENCOUNTER — MYC MEDICAL ADVICE (OUTPATIENT)
Dept: INTERNAL MEDICINE | Facility: CLINIC | Age: 43
End: 2023-07-22
Payer: COMMERCIAL

## 2023-07-22 DIAGNOSIS — M54.12 CERVICAL RADICULOPATHY AT C6: Primary | ICD-10-CM

## 2023-07-26 RX ORDER — GABAPENTIN 300 MG/1
300 CAPSULE ORAL AT BEDTIME
Qty: 30 CAPSULE | Refills: 1 | Status: SHIPPED | OUTPATIENT
Start: 2023-07-26 | End: 2023-08-17

## 2023-07-26 NOTE — TELEPHONE ENCOUNTER
Let him know we can start gabapentin 300 mg at bedtime, I sent it to his pharmacy. I would then like to see him in about 2 weeks to see how this is working, evaluate in person, and determine next steps.

## 2023-08-17 ENCOUNTER — OFFICE VISIT (OUTPATIENT)
Dept: INTERNAL MEDICINE | Facility: CLINIC | Age: 43
End: 2023-08-17
Payer: COMMERCIAL

## 2023-08-17 VITALS
DIASTOLIC BLOOD PRESSURE: 84 MMHG | RESPIRATION RATE: 20 BRPM | TEMPERATURE: 98.2 F | BODY MASS INDEX: 29.84 KG/M2 | HEART RATE: 70 BPM | WEIGHT: 208.4 LBS | OXYGEN SATURATION: 99 % | SYSTOLIC BLOOD PRESSURE: 115 MMHG | HEIGHT: 70 IN

## 2023-08-17 DIAGNOSIS — K59.00 CONSTIPATION, UNSPECIFIED CONSTIPATION TYPE: Primary | ICD-10-CM

## 2023-08-17 DIAGNOSIS — I10 BENIGN ESSENTIAL HYPERTENSION: ICD-10-CM

## 2023-08-17 DIAGNOSIS — M54.12 CERVICAL RADICULOPATHY AT C6: ICD-10-CM

## 2023-08-17 PROCEDURE — 99214 OFFICE O/P EST MOD 30 MIN: CPT | Performed by: NURSE PRACTITIONER

## 2023-08-17 RX ORDER — HYDROCHLOROTHIAZIDE 25 MG/1
25 TABLET ORAL DAILY
Qty: 90 TABLET | Refills: 2 | Status: SHIPPED | OUTPATIENT
Start: 2023-08-17 | End: 2024-05-29

## 2023-08-17 RX ORDER — GABAPENTIN 300 MG/1
CAPSULE ORAL
Qty: 96 CAPSULE | Refills: 1 | Status: SHIPPED | OUTPATIENT
Start: 2023-08-17 | End: 2024-07-25

## 2023-08-17 ASSESSMENT — PAIN SCALES - GENERAL: PAINLEVEL: NO PAIN (1)

## 2023-08-17 NOTE — PROGRESS NOTES
"  Assessment & Plan   Problem List Items Addressed This Visit          Nervous and Auditory    Cervical radiculopathy at C6    Relevant Medications    gabapentin (NEURONTIN) 300 MG capsule     Other Visit Diagnoses       Constipation, unspecified constipation type    -  Primary    Benign essential hypertension        Relevant Medications    hydrochlorothiazide (HYDRODIURIL) 25 MG tablet           - Increase gabapentin to TID (work up to dose by increasing by 1 pill every 3 days). MyChart message in a couple weeks, if doing well continue with current medications and home PT. If unchanged, will order repeat MRI of the cervical spine for consideration of SUN  - Increase fluid intake to help with constipation. Also encouraged high fiber diet             BMI:   Estimated body mass index is 29.9 kg/m  as calculated from the following:    Height as of this encounter: 1.778 m (5' 10\").    Weight as of this encounter: 94.5 kg (208 lb 6.4 oz).       Mariza Nunez NP  [facility]    Marcin Toth is a 43 year old, presenting for the following health issues:  Neck Pain (Pt states that his neck pain started the third week of July; following up ) and Follow Up        8/17/2023     2:51 PM   Additional Questions   Roomed by Cali MATSON   Accompanied by N/A       History of Present Illness       Reason for visit:  Neck pain/stiffness    He eats 0-1 servings of fruits and vegetables daily.He consumes 1 sweetened beverage(s) daily.He exercises with enough effort to increase his heart rate 9 or less minutes per day.  He exercises with enough effort to increase his heart rate 3 or less days per week.   He is taking medications regularly.     Around the end of July he started to have left trapezius, neck, and left shoulder pain and a numb/burning pain that started after a trip sleeping in a different bed. It is worse when he turns the head to the left. It is most comfortable to look down and to the right. He had " "cervical radiculopathy back in 2018 and at that time he did physical therapy. He has done a bit of stretching and his old PT exercises. No loss of bowel/bladder control, no numbness or tingling in the hands or weakness in the arms/hands. No gait changes.     July 25- he had abdomen pain/cramping then a very large, hard BM followed by copious volume of liquid stool. He frequently has symptoms on Mondays where stomach feels crampy. Stools often are small, \"pellet like\".         Objective    /84 (BP Location: Right arm, Patient Position: Sitting, Cuff Size: Adult Regular)   Pulse 70   Temp 98.2  F (36.8  C) (Oral)   Resp 20   Ht 1.778 m (5' 10\")   Wt 94.5 kg (208 lb 6.4 oz)   SpO2 99%   BMI 29.90 kg/m    Body mass index is 29.9 kg/m .  Physical Exam   GENERAL: healthy, alert and no distress  MS: no gross musculoskeletal defects noted,                        "

## 2023-08-28 NOTE — TELEPHONE ENCOUNTER
LMTCB if patient calls back please assist with scheduling.    5 Mm Punch Excision Text: A 5 mm punch biopsy was used to excise the lesion to the level of the subcutaneous fat.  Blunt dissection was used to free the lesion from the surrounding tissues and the lesion was removed.

## 2023-12-28 ENCOUNTER — MYC MEDICAL ADVICE (OUTPATIENT)
Dept: INTERNAL MEDICINE | Facility: CLINIC | Age: 43
End: 2023-12-28
Payer: COMMERCIAL

## 2023-12-28 DIAGNOSIS — M65.30 TRIGGER FINGER, ACQUIRED: Primary | ICD-10-CM

## 2024-01-02 NOTE — PROGRESS NOTES
"ASSESSMENT & PLAN    Navneet was seen today for pain.    Diagnoses and all orders for this visit:    Trigger finger, left little finger    Trigger finger, acquired  -     Orthopedic  Referral    Other orders  -     Hand / Upper Extremity Injection/Arthrocentesis: L small A1      43-year-old male presents with left fifth digit trigger finger ongoing for approximately 1 year.  He has had prior trigger finger injection that lasted several months, however VOMS have returned over the last month.  He does have what is triggering today with some slight tenderness to palpation of the fifth digit flexor tendon at the level of the A1 pulley.    Plan:  -Figure 8 splint provided in clinic today  -Left 5th digit trigger finger injection today in clinic  -Touch base via mychart in 2-3 weeks. If no improvement, will refer to hand surgery     Return if symptoms worsen or fail to improve.      Dr. Rosi Guevara, DO  [facility]  Sports Medicine     -----  Chief Complaint   Patient presents with    Left Hand - Pain       SUBJECTIVE  Mike Rhodes is a/an 43 year old right-handed male who is seen as a self referral for evaluation of left pinky.  Onset was 1 year ago and then was relieved by a cortisone injection. Pain returned a few weeks ago in Dec 2023. Pain is located on the left pinky but mainly gets stuck when he bends the pinky. Symptoms are worsened by flexing the pinky finger and when he plays the bass.  He has tried a cortisone injection. Associated symptoms include tender to palpation  and locking .    The patient is seen alone    Prior injury/Surgical history of affected joint: None  Social History/Occupation: Appliance maintenance and repair, plays lai guitar.    REVIEW OF SYSTEMS:  Pertinent positives/negative: As stated above in HPI    OBJECTIVE:  /78   Ht 1.778 m (5' 10\")   Wt 94.3 kg (208 lb)   BMI 29.84 kg/m     General: Alert and in no distress  Skin: no visable " rashes  CV: Extremities appear well perfused   Resp: normal respiratory effort, no conversational dyspnea   Psych: normal mood, affect  MSK:   Left fifth digit exam  Good range of motion.  Intact sensation to light touch.   strength intact.  Tenderness to palpation of the flexor tendon at the level of A1 pulley.  No palpable nodule.  Witnessed triggering of the fifth digit.    RADIOLOGY:  Final results and radiologist's interpretation available in the [facility] health record.  No imaging today               Hand / Upper Extremity Injection/Arthrocentesis: L small A1    Date/Time: 1/8/2024 9:06 AM    Performed by: Rosi Guevara DO  Authorized by: Rosi Guevara DO    Indications:  Pain, tendon swelling and therapeutic  Needle Size:  25 G  Guidance: ultrasound    Approach:  Volar  Condition: trigger finger    Location:  Small finger    Site:  L small A1  Medications:  40 mg triamcinolone 40 MG/ML; 1 mL lidocaine 1 %  Outcome:  Tolerated well, no immediate complications  Procedure discussed: discussed risks, benefits, and alternatives    Consent Given by:  Patient  Timeout: timeout called immediately prior to procedure    Prep: patient was prepped and draped in usual sterile fashion     Ultrasound was used to ensure safe and accurate needle placement and injection. Ultrasound images of the procedure were permanently stored.

## 2024-01-08 ENCOUNTER — OFFICE VISIT (OUTPATIENT)
Dept: ORTHOPEDICS | Facility: CLINIC | Age: 44
End: 2024-01-08
Attending: NURSE PRACTITIONER
Payer: COMMERCIAL

## 2024-01-08 VITALS
HEIGHT: 70 IN | BODY MASS INDEX: 29.78 KG/M2 | SYSTOLIC BLOOD PRESSURE: 118 MMHG | WEIGHT: 208 LBS | DIASTOLIC BLOOD PRESSURE: 78 MMHG

## 2024-01-08 DIAGNOSIS — M65.352 TRIGGER FINGER, LEFT LITTLE FINGER: Primary | ICD-10-CM

## 2024-01-08 DIAGNOSIS — M65.30 TRIGGER FINGER, ACQUIRED: ICD-10-CM

## 2024-01-08 PROCEDURE — 76942 ECHO GUIDE FOR BIOPSY: CPT | Performed by: STUDENT IN AN ORGANIZED HEALTH CARE EDUCATION/TRAINING PROGRAM

## 2024-01-08 PROCEDURE — 20550 NJX 1 TENDON SHEATH/LIGAMENT: CPT | Mod: F4 | Performed by: STUDENT IN AN ORGANIZED HEALTH CARE EDUCATION/TRAINING PROGRAM

## 2024-01-08 RX ORDER — TRIAMCINOLONE ACETONIDE 40 MG/ML
40 INJECTION, SUSPENSION INTRA-ARTICULAR; INTRAMUSCULAR
Status: SHIPPED | OUTPATIENT
Start: 2024-01-08

## 2024-01-08 RX ORDER — LIDOCAINE HYDROCHLORIDE 10 MG/ML
1 INJECTION, SOLUTION INFILTRATION; PERINEURAL
Status: SHIPPED | OUTPATIENT
Start: 2024-01-08

## 2024-01-08 RX ADMIN — LIDOCAINE HYDROCHLORIDE 1 ML: 10 INJECTION, SOLUTION INFILTRATION; PERINEURAL at 09:06

## 2024-01-08 RX ADMIN — TRIAMCINOLONE ACETONIDE 40 MG: 40 INJECTION, SUSPENSION INTRA-ARTICULAR; INTRAMUSCULAR at 09:06

## 2024-01-08 NOTE — PATIENT INSTRUCTIONS
Thank you for choosing [facility]!    DR. NOLEN'S CLINIC LOCATIONS:     [place]  TRIAGE LINE: [phone] [address] APPOINTMENTS: [phone]   [place] [address] RADIOLOGY: [phone]   (Mondays & Tuesdays) HAND THERAPY: [phone]    PHYSICAL THERAPY: [phone]   [place] BILLING QUESTIONS: [phone] [address] FAX: [phone]   [place] [address]    (Thursdays & Fridays)       Plan:  -Figure 8 splint provided in clinic today  -Left 5th digit trigger finger injection today in clinic  -Touch base via mychart in 2-3 weeks. If no improvement, will refer to hand surgery

## 2024-01-08 NOTE — LETTER
1/8/2024         RE: Mike Rhodes  [address] [address]        Dear Colleague,    Thank you for referring your patient, Mike Rhodes, to the [facility]. Please see a copy of my visit note below.    ASSESSMENT & PLAN    Navneet was seen today for pain.    Diagnoses and all orders for this visit:    Trigger finger, left little finger    Trigger finger, acquired  -     Orthopedic  Referral    Other orders  -     Hand / Upper Extremity Injection/Arthrocentesis: L small A1      43-year-old male presents with left fifth digit trigger finger ongoing for approximately 1 year.  He has had prior trigger finger injection that lasted several months, however VOMS have returned over the last month.  He does have what is triggering today with some slight tenderness to palpation of the fifth digit flexor tendon at the level of the A1 pulley.    Plan:  -Figure 8 splint provided in clinic today  -Left 5th digit trigger finger injection today in clinic  -Touch base via mychart in 2-3 weeks. If no improvement, will refer to hand surgery     Return if symptoms worsen or fail to improve.      Dr. Rosi Guevara, DO  [facility]  Sports Medicine     -----  Chief Complaint   Patient presents with     Left Hand - Pain       SUBJECTIVE  Mike Rhodes is a/an 43 year old right-handed male who is seen as a self referral for evaluation of left pinky.  Onset was 1 year ago and then was relieved by a cortisone injection. Pain returned a few weeks ago in Dec 2023. Pain is located on the left pinky but mainly gets stuck when he bends the pinky. Symptoms are worsened by flexing the pinky finger and when he plays the bass.  He has tried a cortisone injection. Associated symptoms include tender to palpation  and locking .    The patient is seen alone    Prior injury/Surgical history of affected joint: None  Social  "History/Occupation: Appliance maintenance and repair, plays lai guitasrini.    REVIEW OF SYSTEMS:  Pertinent positives/negative: As stated above in HPI    OBJECTIVE:  /78   Ht 1.778 m (5' 10\")   Wt 94.3 kg (208 lb)   BMI 29.84 kg/m     General: Alert and in no distress  Skin: no visable rashes  CV: Extremities appear well perfused   Resp: normal respiratory effort, no conversational dyspnea   Psych: normal mood, affect  MSK:   Left fifth digit exam  Good range of motion.  Intact sensation to light touch.   strength intact.  Tenderness to palpation of the flexor tendon at the level of A1 pulley.  No palpable nodule.  Witnessed triggering of the fifth digit.    RADIOLOGY:  Final results and radiologist's interpretation available in the [facility] health record.  No imaging today               Hand / Upper Extremity Injection/Arthrocentesis: L small A1    Date/Time: 1/8/2024 9:06 AM    Performed by: Rosi Guevara DO  Authorized by: Rosi Guevara DO    Indications:  Pain, tendon swelling and therapeutic  Needle Size:  25 G  Guidance: ultrasound    Approach:  Volar  Condition: trigger finger    Location:  Small finger    Site:  L small A1  Medications:  40 mg triamcinolone 40 MG/ML; 1 mL lidocaine 1 %  Outcome:  Tolerated well, no immediate complications  Procedure discussed: discussed risks, benefits, and alternatives    Consent Given by:  Patient  Timeout: timeout called immediately prior to procedure    Prep: patient was prepped and draped in usual sterile fashion     Ultrasound was used to ensure safe and accurate needle placement and injection. Ultrasound images of the procedure were permanently stored.          Again, thank you for allowing me to participate in the care of your patient.        Sincerely,        Rosi Guevara DO  "

## 2024-02-19 ENCOUNTER — PATIENT OUTREACH (OUTPATIENT)
Dept: CARE COORDINATION | Facility: CLINIC | Age: 44
End: 2024-02-19
Payer: COMMERCIAL

## 2024-02-28 ENCOUNTER — OFFICE VISIT (OUTPATIENT)
Dept: INTERNAL MEDICINE | Facility: CLINIC | Age: 44
End: 2024-02-28
Payer: COMMERCIAL

## 2024-02-28 VITALS
HEIGHT: 70 IN | HEART RATE: 97 BPM | RESPIRATION RATE: 18 BRPM | OXYGEN SATURATION: 98 % | SYSTOLIC BLOOD PRESSURE: 120 MMHG | DIASTOLIC BLOOD PRESSURE: 74 MMHG | WEIGHT: 216 LBS | BODY MASS INDEX: 30.92 KG/M2 | TEMPERATURE: 98.6 F

## 2024-02-28 DIAGNOSIS — K59.01 SLOW TRANSIT CONSTIPATION: ICD-10-CM

## 2024-02-28 DIAGNOSIS — R19.4 CHANGE IN STOOL HABITS: ICD-10-CM

## 2024-02-28 DIAGNOSIS — K64.4 EXTERNAL HEMORRHOIDS: Primary | ICD-10-CM

## 2024-02-28 PROCEDURE — 99213 OFFICE O/P EST LOW 20 MIN: CPT | Performed by: NURSE PRACTITIONER

## 2024-02-28 ASSESSMENT — ASTHMA QUESTIONNAIRES
ACT_TOTALSCORE: 23
QUESTION_2 LAST FOUR WEEKS HOW OFTEN HAVE YOU HAD SHORTNESS OF BREATH: ONCE OR TWICE A WEEK
QUESTION_1 LAST FOUR WEEKS HOW MUCH OF THE TIME DID YOUR ASTHMA KEEP YOU FROM GETTING AS MUCH DONE AT WORK, SCHOOL OR AT HOME: NONE OF THE TIME
ACT_TOTALSCORE: 23
QUESTION_3 LAST FOUR WEEKS HOW OFTEN DID YOUR ASTHMA SYMPTOMS (WHEEZING, COUGHING, SHORTNESS OF BREATH, CHEST TIGHTNESS OR PAIN) WAKE YOU UP AT NIGHT OR EARLIER THAN USUAL IN THE MORNING: NOT AT ALL
QUESTION_5 LAST FOUR WEEKS HOW WOULD YOU RATE YOUR ASTHMA CONTROL: WELL CONTROLLED
QUESTION_4 LAST FOUR WEEKS HOW OFTEN HAVE YOU USED YOUR RESCUE INHALER OR NEBULIZER MEDICATION (SUCH AS ALBUTEROL): NOT AT ALL

## 2024-02-28 NOTE — PROGRESS NOTES
"  Assessment & Plan   Problem List Items Addressed This Visit    None  Visit Diagnoses       External hemorrhoids    -  Primary    Change in stool habits        Relevant Orders    Adult GI  Referral - Procedure Only    Slow transit constipation        Relevant Orders    Adult GI  Referral - Procedure Only           - Due to change in stool habits over the past 6+ months, we discussed colon cancer screening particularly since there is family history (not a first degree relative), referral placed  - Reviewed conservative measures of hemorrhoid management particularly prevention of constipation and prolonged sitting/straining. Add a fiber supplement. Encouraged high fiber diet, exercise, good hydration. May use tub soaks, wet wipes to assist with hygiene and cleaning. If interventions are ineffective, we also discussed referral to a colon and rectal specialist.      Return in about 4 weeks (around 3/27/2024) for physical .       BMI  Estimated body mass index is 30.99 kg/m  as calculated from the following:    Height as of this encounter: 1.778 m (5' 10\").    Weight as of this encounter: 98 kg (216 lb).         Marcin Toth is a 44 year old, presenting for the following health issues:  Constipation (Theres a little bump around my anus which starting to hurt. )        2/28/2024     4:11 PM   Additional Questions   Roomed by Shiva Cuevas   Accompanied by N/A     History of Present Illness       Reason for visit:  Constipation/digestive  Symptom onset:  3-7 days ago    He eats 0-1 servings of fruits and vegetables daily.He consumes 1 sweetened beverage(s) daily. He exercises with enough effort to increase his heart rate 4 days per week.   He is taking medications regularly.     He has had a bump around the anal area that he noticed a few months ago. In the past 2 days it feels bigger and more painful. No bleeding. He was seen in the past for constipation, he has had a few more instances of constipation, " "most recently on Monday when he had to sit for a prolonged period to have a bowel movement. Stools are often large.     Family history of colon cancer- paternal uncle in his 60s.         Objective    /74 (BP Location: Right arm, Patient Position: Sitting, Cuff Size: Adult Regular)   Pulse 97   Temp 98.6  F (37  C) (Oral)   Resp 18   Ht 1.778 m (5' 10\")   Wt 98 kg (216 lb)   SpO2 98%   BMI 30.99 kg/m    Body mass index is 30.99 kg/m .    Physical Exam   GENERAL: alert and no distress  RECTAL: External hemorrhoid noted at 9:00 approximately 1.5 cm in diameter. Not bleeding or thrombosed. No prolapsed hemorrhoids. No rectal fissures or bleeding.               Signed Electronically by: Mariza Nunez NP    "

## 2024-03-04 ENCOUNTER — PATIENT OUTREACH (OUTPATIENT)
Dept: CARE COORDINATION | Facility: CLINIC | Age: 44
End: 2024-03-04
Payer: COMMERCIAL

## 2024-03-11 ENCOUNTER — MYC MEDICAL ADVICE (OUTPATIENT)
Dept: INTERNAL MEDICINE | Facility: CLINIC | Age: 44
End: 2024-03-11
Payer: COMMERCIAL

## 2024-05-05 ENCOUNTER — HEALTH MAINTENANCE LETTER (OUTPATIENT)
Age: 44
End: 2024-05-05

## 2024-05-24 ENCOUNTER — TRANSFERRED RECORDS (OUTPATIENT)
Dept: HEALTH INFORMATION MANAGEMENT | Facility: CLINIC | Age: 44
End: 2024-05-24
Payer: COMMERCIAL

## 2024-05-29 DIAGNOSIS — I10 BENIGN ESSENTIAL HYPERTENSION: ICD-10-CM

## 2024-05-29 RX ORDER — HYDROCHLOROTHIAZIDE 25 MG/1
25 TABLET ORAL DAILY
Qty: 90 TABLET | Refills: 0 | Status: SHIPPED | OUTPATIENT
Start: 2024-05-29 | End: 2024-07-25

## 2024-05-29 NOTE — TELEPHONE ENCOUNTER
Call patient: He is due for physical and lab work related to his recent medication refill for hydrochlorothiazide.  Please assist with scheduling.

## 2024-07-22 SDOH — HEALTH STABILITY: PHYSICAL HEALTH: ON AVERAGE, HOW MANY DAYS PER WEEK DO YOU ENGAGE IN MODERATE TO STRENUOUS EXERCISE (LIKE A BRISK WALK)?: 3 DAYS

## 2024-07-22 SDOH — HEALTH STABILITY: PHYSICAL HEALTH: ON AVERAGE, HOW MANY MINUTES DO YOU ENGAGE IN EXERCISE AT THIS LEVEL?: 30 MIN

## 2024-07-22 ASSESSMENT — SOCIAL DETERMINANTS OF HEALTH (SDOH): HOW OFTEN DO YOU GET TOGETHER WITH FRIENDS OR RELATIVES?: ONCE A WEEK

## 2024-07-25 ENCOUNTER — OFFICE VISIT (OUTPATIENT)
Dept: INTERNAL MEDICINE | Facility: CLINIC | Age: 44
End: 2024-07-25
Payer: COMMERCIAL

## 2024-07-25 VITALS
TEMPERATURE: 98.2 F | DIASTOLIC BLOOD PRESSURE: 80 MMHG | WEIGHT: 212.7 LBS | BODY MASS INDEX: 30.45 KG/M2 | SYSTOLIC BLOOD PRESSURE: 124 MMHG | HEIGHT: 70 IN | RESPIRATION RATE: 16 BRPM | OXYGEN SATURATION: 97 % | HEART RATE: 73 BPM

## 2024-07-25 DIAGNOSIS — I10 PRIMARY HYPERTENSION: ICD-10-CM

## 2024-07-25 DIAGNOSIS — Z00.00 ROUTINE GENERAL MEDICAL EXAMINATION AT A HEALTH CARE FACILITY: Primary | ICD-10-CM

## 2024-07-25 DIAGNOSIS — Z86.0100 HISTORY OF COLONIC POLYPS: ICD-10-CM

## 2024-07-25 DIAGNOSIS — I10 BENIGN ESSENTIAL HYPERTENSION: ICD-10-CM

## 2024-07-25 DIAGNOSIS — M65.30 TRIGGER FINGER, ACQUIRED: ICD-10-CM

## 2024-07-25 DIAGNOSIS — M25.562 ACUTE PAIN OF LEFT KNEE: ICD-10-CM

## 2024-07-25 LAB
ANION GAP SERPL CALCULATED.3IONS-SCNC: 7 MMOL/L (ref 7–15)
BUN SERPL-MCNC: 24.2 MG/DL (ref 6–20)
CALCIUM SERPL-MCNC: 9.5 MG/DL (ref 8.8–10.4)
CHLORIDE SERPL-SCNC: 106 MMOL/L (ref 98–107)
CREAT SERPL-MCNC: 1.19 MG/DL (ref 0.67–1.17)
EGFRCR SERPLBLD CKD-EPI 2021: 78 ML/MIN/1.73M2
GLUCOSE SERPL-MCNC: 101 MG/DL (ref 70–99)
HCO3 SERPL-SCNC: 27 MMOL/L (ref 22–29)
POTASSIUM SERPL-SCNC: 4.2 MMOL/L (ref 3.4–5.3)
SODIUM SERPL-SCNC: 140 MMOL/L (ref 135–145)

## 2024-07-25 PROCEDURE — 99396 PREV VISIT EST AGE 40-64: CPT | Performed by: NURSE PRACTITIONER

## 2024-07-25 PROCEDURE — 36415 COLL VENOUS BLD VENIPUNCTURE: CPT | Performed by: NURSE PRACTITIONER

## 2024-07-25 PROCEDURE — 80048 BASIC METABOLIC PNL TOTAL CA: CPT | Performed by: NURSE PRACTITIONER

## 2024-07-25 RX ORDER — HYDROCHLOROTHIAZIDE 25 MG/1
25 TABLET ORAL DAILY
Qty: 90 TABLET | Refills: 3 | Status: SHIPPED | OUTPATIENT
Start: 2024-07-25

## 2024-07-25 ASSESSMENT — PAIN SCALES - GENERAL: PAINLEVEL: NO PAIN (0)

## 2024-07-25 NOTE — PATIENT INSTRUCTIONS
Patient Education   Preventive Care Advice   This is general advice given by our system to help you stay healthy. However, your care team may have specific advice just for you. Please talk to your care team about your preventive care needs.  Nutrition  Eat 5 or more servings of fruits and vegetables each day.  Try wheat bread, brown rice and whole grain pasta (instead of white bread, rice, and pasta).  Get enough calcium and vitamin D. Check the label on foods and aim for 100% of the RDA (recommended daily allowance).  Lifestyle  Exercise at least 150 minutes each week  (30 minutes a day, 5 days a week).  Do muscle strengthening activities 2 days a week. These help control your weight and prevent disease.  No smoking.  Wear sunscreen to prevent skin cancer.  Have a dental exam and cleaning every 6 months.  Yearly exams  See your health care team every year to talk about:  Any changes in your health.  Any medicines your care team has prescribed.  Preventive care, family planning, and ways to prevent chronic diseases.  Shots (vaccines)   HPV shots (up to age 26), if you've never had them before.  Hepatitis B shots (up to age 59), if you've never had them before.  COVID-19 shot: Get this shot when it's due.  Flu shot: Get a flu shot every year.  Tetanus shot: Get a tetanus shot every 10 years.  Pneumococcal, hepatitis A, and RSV shots: Ask your care team if you need these based on your risk.  Shingles shot (for age 50 and up)  General health tests  Diabetes screening:  Starting at age 35, Get screened for diabetes at least every 3 years.  If you are younger than age 35, ask your care team if you should be screened for diabetes.  Cholesterol test: At age 39, start having a cholesterol test every 5 years, or more often if advised.  Bone density scan (DEXA): At age 50, ask your care team if you should have this scan for osteoporosis (brittle bones).  Hepatitis C: Get tested at least once in your life.  STIs (sexually  transmitted infections)  Before age 24: Ask your care team if you should be screened for STIs.  After age 24: Get screened for STIs if you're at risk. You are at risk for STIs (including HIV) if:  You are sexually active with more than one person.  You don't use condoms every time.  You or a partner was diagnosed with a sexually transmitted infection.  If you are at risk for HIV, ask about PrEP medicine to prevent HIV.  Get tested for HIV at least once in your life, whether you are at risk for HIV or not.  Cancer screening tests  Cervical cancer screening: If you have a cervix, begin getting regular cervical cancer screening tests starting at age 21.  Breast cancer scan (mammogram): If you've ever had breasts, begin having regular mammograms starting at age 40. This is a scan to check for breast cancer.  Colon cancer screening: It is important to start screening for colon cancer at age 45.  Have a colonoscopy test every 10 years (or more often if you're at risk) Or, ask your provider about stool tests like a FIT test every year or Cologuard test every 3 years.  To learn more about your testing options, visit:   .  For help making a decision, visit:   https://bit.ly/tj29838.  Prostate cancer screening test: If you have a prostate, ask your care team if a prostate cancer screening test (PSA) at age 55 is right for you.  Lung cancer screening: If you are a current or former smoker ages 50 to 80, ask your care team if ongoing lung cancer screenings are right for you.  For informational purposes only. Not to replace the advice of your health care provider. Copyright   2023 [employer]. All rights reserved. Clinically reviewed by the [facility]. [place] [id number] - REV 01/24.

## 2024-07-25 NOTE — ASSESSMENT & PLAN NOTE
Not bothering him today but from his history sounds like he has patellofemoral syndrome.  He was given a handout with knee strengthening exercises.  Could consider formal PT referral if he is not experiencing relief with these measures.

## 2024-07-25 NOTE — PROGRESS NOTES
"Preventive Care Visit  [facility]  Mariza Nunez NP  Jul 25, 2024      Assessment & Plan   Problem List Items Addressed This Visit       Primary hypertension     Stable. Repeat BMP today          Relevant Medications    hydrochlorothiazide (HYDRODIURIL) 25 MG tablet    Trigger finger, acquired     If worsening, would recommend surgical intervention. He declines referral at this time.          History of colonic polyps, repeat colonoscopy 5/2025     Repeat colonoscopy 5/2025          Acute pain of left knee     Not bothering him today but from his history sounds like he has patellofemoral syndrome.  He was given a handout with knee strengthening exercises.  Could consider formal PT referral if he is not experiencing relief with these measures.          Other Visit Diagnoses       Routine general medical examination at a health care facility    -  Primary    Benign essential hypertension        Relevant Medications    hydrochlorothiazide (HYDRODIURIL) 25 MG tablet    Other Relevant Orders    Basic metabolic panel           - COVID vaccine today        BMI  Estimated body mass index is 30.52 kg/m  as calculated from the following:    Height as of this encounter: 1.778 m (5' 10\").    Weight as of this encounter: 96.5 kg (212 lb 11.2 oz).       Counseling  Appropriate preventive services were addressed with this patient via screening, questionnaire, or discussion as appropriate for fall prevention, nutrition, physical activity, Tobacco-use cessation, weight loss and cognition.  Checklist reviewing preventive services available has been given to the patient.  Reviewed patient's diet, addressing concerns and/or questions.   He is at risk for lack of exercise and has been provided with information to increase physical activity for the benefit of his well-being.   He is at risk for psychosocial distress and has been provided with information to reduce risk.       Return in about 1 year (around " 7/25/2025) for preventive visit .      Subjective   Navneet is a 44 year old, presenting for the following:  Physical       Health Care Directive  Patient does not have a Health Care Directive or Living Will: Discussed advance care planning with patient; information given to patient to review.    HPI  He has a trigger finger that has been injected with corticosteroid x 2. He can tell that it is coming back but it is manageable at this time.     The left knee has been occasionally painful. If he has been walking a lot or going up steps he has pain.    Constipation - he was advised to mix 1 tsp miralax and metamucil daily. He generally does this on the weekend and it is helpful.        7/22/2024   General Health   How would you rate your overall physical health? Good   Feel stress (tense, anxious, or unable to sleep) Only a little      (!) STRESS CONCERN      7/22/2024   Nutrition   Three or more servings of calcium each day? Yes   Diet: Low salt   How many servings of fruit and vegetables per day? (!) 0-1   How many sweetened beverages each day? (!) 2            7/22/2024   Exercise   Days per week of moderate/strenous exercise 3 days   Average minutes spent exercising at this level 30 min            7/22/2024   Social Factors   Frequency of gathering with friends or relatives Once a week   Worry food won't last until get money to buy more No   Food not last or not have enough money for food? No   Do you have housing? (Housing is defined as stable permanent housing and does not include staying ouside in a car, in a tent, in an abandoned building, in an overnight shelter, or couch-surfing.) Yes   Are you worried about losing your housing? No   Lack of transportation? No   Unable to get utilities (heat,electricity)? No            7/22/2024   Dental   Dentist two times every year? Yes            7/22/2024   TB Screening   Were you born outside of the [place]? No            7/22/2024   Substance Use   Alcohol more than 3/day or  "more than 7/wk No   Do you use any other substances recreationally? No        Social History     Tobacco Use    Smoking status: Never    Smokeless tobacco: Never   Substance Use Topics    Alcohol use: No     Comment: Alcoholic Drinks/day: Never a problem, but there are other family members who have had issues.    Drug use: No           7/22/2024   STI Screening   New sexual partner(s) since last STI/HIV test? No      ASCVD Risk   The 10-year ASCVD risk score (Rick GARCIA, et al., 2019) is: 1.9%    Values used to calculate the score:      Age: 44 years      Sex: Male      Is Non- : No      Diabetic: No      Tobacco smoker: No      Systolic Blood Pressure: 124 mmHg      Is BP treated: Yes      HDL Cholesterol: 47 mg/dL      Total Cholesterol: 196 mg/dL        7/22/2024   Contraception/Family Planning   Questions about contraception or family planning No      Reviewed and updated as needed this visit by Provider   Tobacco  Allergies  Meds  Problems  Med Hx  Surg Hx  Fam Hx                 Objective    Exam  /80 (BP Location: Right arm, Patient Position: Sitting, Cuff Size: Adult Regular)   Pulse 73   Temp 98.2  F (36.8  C) (Oral)   Resp 16   Ht 1.778 m (5' 10\")   Wt 96.5 kg (212 lb 11.2 oz)   SpO2 97%   BMI 30.52 kg/m     Estimated body mass index is 30.52 kg/m  as calculated from the following:    Height as of this encounter: 1.778 m (5' 10\").    Weight as of this encounter: 96.5 kg (212 lb 11.2 oz).    Physical Exam  GENERAL: alert and no distress  EYES: Eyes grossly normal to inspection, conjunctivae and sclerae normal  HENT: ear canals and TM's normal, and mouth without ulcers or lesions  NECK: no adenopathy, no asymmetry, masses, or scars  RESP: lungs clear to auscultation - no rales, rhonchi or wheezes  CV: regular rate and rhythm, normal S1 S2, no S3 or S4, no murmur, click or rub, no peripheral edema  ABDOMEN: soft, nontender, no hepatosplenomegaly, no masses " and bowel sounds normal  NEURO: Normal strength and tone, mentation intact and speech normal  PSYCH: mentation appears normal, affect normal/bright        Signed Electronically by: Mariza Nunez NP

## 2024-07-26 ENCOUNTER — PATIENT OUTREACH (OUTPATIENT)
Dept: GASTROENTEROLOGY | Facility: CLINIC | Age: 44
End: 2024-07-26
Payer: COMMERCIAL

## 2025-02-27 ENCOUNTER — TELEPHONE (OUTPATIENT)
Dept: GASTROENTEROLOGY | Facility: CLINIC | Age: 45
End: 2025-02-27
Payer: COMMERCIAL

## 2025-02-27 NOTE — TELEPHONE ENCOUNTER
"Pt would like to go to [facility]    Endoscopy Scheduling Screen    Have you had any respiratory illness or flu-like symptoms in the last 10 days?  No    What is your communication preference for Instructions and/or Bowel Prep?   MyChart    What insurance is in the chart?  Other:  [employer]    Ordering/Referring Provider: CATERINA HOLM    (If ordering provider performs procedure, schedule with ordering provider unless otherwise instructed. )    BMI: Estimated body mass index is 30.52 kg/m  as calculated from the following:    Height as of 7/25/24: 1.778 m (5' 10\").    Weight as of 7/25/24: 96.5 kg (212 lb 11.2 oz).     Sedation Ordered  moderate sedation.   If patient BMI > 50 do not schedule in [facility].    If patient BMI > 45 do not schedule at [facility].    Are you taking methadone or Suboxone?  NO, No RN review required.    Have you been diagnosed and are being treated for severe PTSD or severe anxiety?  NO, No RN review required.    Are you taking any prescription medications for pain 3 or more times per week?   NO, No RN review required.    Do you have a history of malignant hyperthermia?  No    (Females) Are you currently pregnant?        Have you been diagnosed or told you have pulmonary hypertension?   No    Do you have an LVAD?  No    Have you been told you have moderate to severe sleep apnea?  No.    Have you been told you have COPD, asthma, or any other lung disease?  No    Has your doctor ordered any cardiac tests like echo, angiogram, stress test, ablation, or EKG, that you have not completed yet?  No    Do you  have a history of any heart conditions?  No     Have you ever had or are you waiting for an organ transplant?  No. Continue scheduling, no site restrictions.    Have you had a stroke or transient ischemic attack (TIA aka \"mini stroke\") in the last 2 years?   No.    Have you been diagnosed with or been told you have cirrhosis of the liver?   No.    Are you currently on dialysis?   No    Do you need assistance " "transferring?   No    BMI: Estimated body mass index is 30.52 kg/m  as calculated from the following:    Height as of 7/25/24: 1.778 m (5' 10\").    Weight as of 7/25/24: 96.5 kg (212 lb 11.2 oz).     Is patients BMI > 40 and scheduling location [facility]?  No    Do you take an injectable or oral medication for weight loss or diabetes (excluding insulin)?  No    Do you take the medication Naltrexone?  No    Do you take blood thinners?  No       Prep   Are you currently on dialysis or do you have chronic kidney disease?  No    Do you have a diagnosis of diabetes?  No    Do you have a diagnosis of cystic fibrosis (CF)?  No    On a regular basis do you go 3 -5 days between bowel movements?  No    BMI > 40?  No    Preferred Pharmacy:    [facility] [address] [facility] - [place], [place] - [address]  [address] [place] [address]  Phone: [phone] Fax: [phone]      "

## 2025-06-30 ENCOUNTER — PATIENT OUTREACH (OUTPATIENT)
Dept: CARE COORDINATION | Facility: CLINIC | Age: 45
End: 2025-06-30
Payer: COMMERCIAL

## 2025-07-14 ENCOUNTER — PATIENT OUTREACH (OUTPATIENT)
Dept: CARE COORDINATION | Facility: CLINIC | Age: 45
End: 2025-07-14
Payer: COMMERCIAL